# Patient Record
Sex: FEMALE | Race: WHITE | NOT HISPANIC OR LATINO | Employment: OTHER | URBAN - METROPOLITAN AREA
[De-identification: names, ages, dates, MRNs, and addresses within clinical notes are randomized per-mention and may not be internally consistent; named-entity substitution may affect disease eponyms.]

---

## 2017-02-27 ENCOUNTER — TRANSCRIBE ORDERS (OUTPATIENT)
Dept: MAMMOGRAPHY | Facility: HOSPITAL | Age: 67
End: 2017-02-27

## 2017-02-27 DIAGNOSIS — Z12.31 VISIT FOR SCREENING MAMMOGRAM: Primary | ICD-10-CM

## 2017-03-20 ENCOUNTER — HOSPITAL ENCOUNTER (OUTPATIENT)
Dept: MAMMOGRAPHY | Facility: HOSPITAL | Age: 67
Discharge: HOME | End: 2017-03-20
Attending: OBSTETRICS & GYNECOLOGY | Admitting: OBSTETRICS & GYNECOLOGY

## 2017-03-20 DIAGNOSIS — Z12.31 VISIT FOR SCREENING MAMMOGRAM: ICD-10-CM

## 2017-03-20 PROCEDURE — 77067 SCR MAMMO BI INCL CAD: CPT | Performed by: RADIOLOGY

## 2017-03-20 PROCEDURE — G0202 SCR MAMMO BI INCL CAD: HCPCS

## 2017-03-20 PROCEDURE — 77063 BREAST TOMOSYNTHESIS BI: CPT | Performed by: RADIOLOGY

## 2017-03-20 PROCEDURE — 77063 BREAST TOMOSYNTHESIS BI: CPT

## 2017-05-13 ENCOUNTER — OFFICE VISIT (OUTPATIENT)
Dept: RETAIL CLINIC | Facility: CLINIC | Age: 67
End: 2017-05-13

## 2017-05-13 VITALS — HEART RATE: 69 BPM | TEMPERATURE: 98.4 F | OXYGEN SATURATION: 97 %

## 2017-05-13 DIAGNOSIS — J06.9 ACUTE UPPER RESPIRATORY INFECTION: Primary | ICD-10-CM

## 2017-05-13 PROCEDURE — 99213 OFFICE O/P EST LOW 20 MIN: CPT | Performed by: NURSE PRACTITIONER

## 2017-05-13 RX ORDER — FLUTICASONE PROPIONATE 50 MCG
2 SPRAY, SUSPENSION (ML) NASAL DAILY
Qty: 1 EACH | Refills: 0 | Status: SHIPPED | OUTPATIENT
Start: 2017-05-13 | End: 2017-06-12

## 2017-05-13 RX ORDER — PSEUDOEPHEDRINE HYDROCHLORIDE 60 MG/1
60 TABLET, FILM COATED ORAL EVERY 6 HOURS PRN
Qty: 15 TABLET | Refills: 0 | Status: SHIPPED | OUTPATIENT
Start: 2017-05-13 | End: 2018-02-17

## 2017-07-10 ENCOUNTER — OFFICE VISIT (OUTPATIENT)
Dept: ONCOLOGY | Facility: CLINIC | Age: 67
End: 2017-07-10

## 2017-07-10 ENCOUNTER — APPOINTMENT (OUTPATIENT)
Dept: LAB | Facility: HOSPITAL | Age: 67
End: 2017-07-10

## 2017-07-10 VITALS
DIASTOLIC BLOOD PRESSURE: 66 MMHG | HEIGHT: 62 IN | BODY MASS INDEX: 33.68 KG/M2 | SYSTOLIC BLOOD PRESSURE: 122 MMHG | HEART RATE: 88 BPM | RESPIRATION RATE: 18 BRPM | TEMPERATURE: 97.4 F | WEIGHT: 183 LBS

## 2017-07-10 DIAGNOSIS — C83.31 DIFFUSE LARGE B-CELL LYMPHOMA OF LYMPH NODES OF NECK (HCC): Primary | ICD-10-CM

## 2017-07-10 LAB
ALBUMIN SERPL-MCNC: 4.1 G/DL (ref 3.2–4.8)
ALBUMIN/GLOB SERPL: 1.9 G/DL (ref 1.5–2.5)
ALP SERPL-CCNC: 91 U/L (ref 25–100)
ALT SERPL W P-5'-P-CCNC: 21 U/L (ref 7–40)
ANION GAP SERPL CALCULATED.3IONS-SCNC: 11 MMOL/L (ref 3–11)
AST SERPL-CCNC: 22 U/L (ref 0–33)
BILIRUB SERPL-MCNC: 0.4 MG/DL (ref 0.3–1.2)
BUN BLD-MCNC: 15 MG/DL (ref 9–23)
BUN/CREAT SERPL: 15 (ref 7–25)
CALCIUM SPEC-SCNC: 9.6 MG/DL (ref 8.7–10.4)
CHLORIDE SERPL-SCNC: 103 MMOL/L (ref 99–109)
CO2 SERPL-SCNC: 27 MMOL/L (ref 20–31)
CREAT BLD-MCNC: 1 MG/DL (ref 0.6–1.3)
ERYTHROCYTE [DISTWIDTH] IN BLOOD BY AUTOMATED COUNT: 13.7 % (ref 11.3–14.5)
GFR SERPL CREATININE-BSD FRML MDRD: 56 ML/MIN/1.73
GLOBULIN UR ELPH-MCNC: 2.2 GM/DL
GLUCOSE BLD-MCNC: 172 MG/DL (ref 70–100)
HCT VFR BLD AUTO: 38.6 % (ref 34.5–44)
HGB BLD-MCNC: 12.2 G/DL (ref 11.5–15.5)
LDH SERPL-CCNC: 208 U/L (ref 120–246)
LYMPHOCYTES # BLD AUTO: 2 10*3/MM3 (ref 0.6–4.8)
LYMPHOCYTES NFR BLD AUTO: 40.7 % (ref 24–44)
MCH RBC QN AUTO: 27.9 PG (ref 27–31)
MCHC RBC AUTO-ENTMCNC: 31.6 G/DL (ref 32–36)
MCV RBC AUTO: 88.1 FL (ref 80–99)
MONOCYTES # BLD AUTO: 0.2 10*3/MM3 (ref 0–1)
MONOCYTES NFR BLD AUTO: 3.3 % (ref 0–12)
NEUTROPHILS # BLD AUTO: 2.8 10*3/MM3 (ref 1.5–8.3)
NEUTROPHILS NFR BLD AUTO: 56 % (ref 41–71)
PLATELET # BLD AUTO: 242 10*3/MM3 (ref 150–450)
PMV BLD AUTO: 6.5 FL (ref 6–12)
POTASSIUM BLD-SCNC: 3.6 MMOL/L (ref 3.5–5.5)
PROT SERPL-MCNC: 6.3 G/DL (ref 5.7–8.2)
RBC # BLD AUTO: 4.39 10*6/MM3 (ref 3.89–5.14)
SODIUM BLD-SCNC: 141 MMOL/L (ref 132–146)
WBC NRBC COR # BLD: 5 10*3/MM3 (ref 3.5–10.8)

## 2017-07-10 PROCEDURE — 83615 LACTATE (LD) (LDH) ENZYME: CPT | Performed by: NURSE PRACTITIONER

## 2017-07-10 PROCEDURE — 36415 COLL VENOUS BLD VENIPUNCTURE: CPT | Performed by: NURSE PRACTITIONER

## 2017-07-10 PROCEDURE — 80053 COMPREHEN METABOLIC PANEL: CPT | Performed by: NURSE PRACTITIONER

## 2017-07-10 PROCEDURE — 99212 OFFICE O/P EST SF 10 MIN: CPT | Performed by: NURSE PRACTITIONER

## 2017-07-10 PROCEDURE — 85025 COMPLETE CBC W/AUTO DIFF WBC: CPT | Performed by: NURSE PRACTITIONER

## 2017-07-10 NOTE — PROGRESS NOTES
"      PROBLEM LIST:  1. Diffuse large B-cell lymphoma.   a) Original diagnosis 06/2008 from a large right neck mass.   b) Complete response to R-CHOP x6.   c) Radiation in 06/2009 for local recurrence in the right neck without recurrence following this.      CHIEF COMPLAINT: Follow-up about lymphoma.    Subjective     HISTORY OF PRESENT ILLNESS:   Mrs. Fitzpatrick is here for follow-up evaluation of lymphoma.  She has been well since her last visit.  She is maintaining her usual daily activities.  She denies any recurring fevers, drenching night sweats, unintentional weight loss, rashes, itching, or lymphadenopathy.    Past Medical History, Past Surgical History, Social History, Family History have been reviewed and are without significant changes except as mentioned.    Review of Systems   A comprehensive 14 point review of systems was performed and was negative except as mentioned.    Medications:  The current medication list was reviewed in the EMR    ALLERGIES:  No Known Allergies    Objective      /66  Pulse 88  Temp 97.4 °F (36.3 °C)  Resp 18  Ht 62\" (157.5 cm)  Wt 183 lb (83 kg)  LMP 03/20/2001  BMI 33.47 kg/m2         General: well appearing, in no acute distress   HEENT: sclera anicteric, oropharynx clear  Lymphatics: no cervical, supraclavicular, or axillary adenopathy  Cardiovascular: regular rate and rhythm, no murmurs  Lungs: clear to auscultation bilaterally  Abdomen: soft, nontender, nondistended.  No palpable masses or organomegaly  Extremeties: no lower extremity edema, cords or calf tenderness  Skin: no rashes, lesions, bruising, or petechiae    RECENT LABS:  Hematology WBC   Date Value Ref Range Status   05/23/2016 4.30 3.50 - 10.80 K/mcL Final     Hemoglobin   Date Value Ref Range Status   05/23/2016 12.3 11.5 - 15.5 g/dL Final     Hematocrit   Date Value Ref Range Status   05/23/2016 37.5 34.5 - 44.0 % Final     MCV   Date Value Ref Range Status   05/23/2016 87.5 80.0 - 99.0 fL Final "     RDW   Date Value Ref Range Status   05/23/2016 13.9 11.3 - 14.5 % Final     MPV   Date Value Ref Range Status   05/23/2016 6.4 fL Final     Platelets   Date Value Ref Range Status   05/23/2016 235 150 - 450 K/mcL Final     Neutrophils Absolute   Date Value Ref Range Status   05/23/2016 2.40 1.50 - 8.30 K/mcL Final     Lymphocytes Absolute   Date Value Ref Range Status   05/23/2016 1.60 0.60 - 4.80 K/mcL Final     Monocytes Absolute   Date Value Ref Range Status   05/23/2016 0.20 0.00 - 1.00 K/mcL Final     Eosinophils Absolute   Date Value Ref Range Status   06/12/2015 0.16 0.10 - 0.30 K/mcL Final     Basophils Absolute   Date Value Ref Range Status   06/12/2015 0.03 0.00 - 0.20 K/mcL Final     nRBC   Date Value Ref Range Status   06/05/2014 0.0  Final       Glucose   Date Value Ref Range Status   05/23/2016 111 (H) 70 - 100 mg/dL Final     Sodium   Date Value Ref Range Status   05/23/2016 145 132 - 146 mmol/L Final     Potassium   Date Value Ref Range Status   05/23/2016 3.9 3.5 - 5.5 mmol/L Final     CO2   Date Value Ref Range Status   05/23/2016 34 (H) 20 - 31 mmol/L Final     Chloride   Date Value Ref Range Status   05/23/2016 105 99 - 109 mmol/L Final     Anion Gap   Date Value Ref Range Status   05/23/2016 6 3 - 11 mmol/L Final     Creatinine   Date Value Ref Range Status   05/23/2016 0.9 0.6 - 1.3 mg/dL Final     BUN   Date Value Ref Range Status   05/23/2016 17 9 - 23 mg/dL Final     Calcium   Date Value Ref Range Status   05/23/2016 9.5 8.7 - 10.4 mg/dL Final     Alkaline Phosphatase   Date Value Ref Range Status   05/23/2016 88 46 - 116 Units/L Final     Total Protein   Date Value Ref Range Status   05/23/2016 6.7 5.7 - 8.2 g/dL Final     ALT (SGPT)   Date Value Ref Range Status   05/23/2016 15 7 - 40 Units/L Final     AST (SGOT)   Date Value Ref Range Status   05/23/2016 17 0 - 33 Units/L Final     Total Bilirubin   Date Value Ref Range Status   05/23/2016 0.2 (L) 0.3 - 1.2 mg/dL Final     Albumin    Date Value Ref Range Status   05/23/2016 4.2 3.2 - 4.8 g/dL Final       LDH   Date Value Ref Range Status   05/23/2016 189 120 - 246 Units/L Final          Assessment/Plan   IMPRESSION:   1. Treated diffuse large B-cell lymphoma. She continues to do well after her initial chemotherapy followed by radiation for local recurrence. She has no evidence of recurrence at this time.      PLAN:   1. We will obtain labs today including CBC, CMP, and LDH and contact her if they are adversely changed.   2. We will plan on seeing her back in 1 year for follow-up evaluation. She has been instructed to contact us in the interim if any new symptoms arise.                   JUAN Harrell  [facility] Hematology and Oncology    7/10/2017          CC:

## 2018-02-17 ENCOUNTER — OFFICE VISIT (OUTPATIENT)
Dept: RETAIL CLINIC | Facility: CLINIC | Age: 68
End: 2018-02-17

## 2018-02-17 VITALS
DIASTOLIC BLOOD PRESSURE: 76 MMHG | RESPIRATION RATE: 18 BRPM | WEIGHT: 181 LBS | BODY MASS INDEX: 30.9 KG/M2 | SYSTOLIC BLOOD PRESSURE: 100 MMHG | HEIGHT: 64 IN | HEART RATE: 103 BPM | TEMPERATURE: 98.9 F | OXYGEN SATURATION: 96 %

## 2018-02-17 DIAGNOSIS — H66.93 BILATERAL ACUTE OTITIS MEDIA: Primary | ICD-10-CM

## 2018-02-17 DIAGNOSIS — K21.9 GASTROESOPHAGEAL REFLUX DISEASE, ESOPHAGITIS PRESENCE NOT SPECIFIED: ICD-10-CM

## 2018-02-17 LAB
EXPIRATION DATE: NORMAL
FLUAV AG NPH QL: NEGATIVE
FLUBV AG NPH QL: NEGATIVE
INTERNAL CONTROL: NORMAL
Lab: NORMAL

## 2018-02-17 PROCEDURE — 99214 OFFICE O/P EST MOD 30 MIN: CPT | Performed by: NURSE PRACTITIONER

## 2018-02-17 PROCEDURE — 87804 INFLUENZA ASSAY W/OPTIC: CPT | Performed by: NURSE PRACTITIONER

## 2018-02-17 RX ORDER — RANITIDINE 150 MG/1
150 TABLET ORAL NIGHTLY
Qty: 30 TABLET | Refills: 0 | Status: SHIPPED | OUTPATIENT
Start: 2018-02-17 | End: 2018-08-20 | Stop reason: HOSPADM

## 2018-02-17 RX ORDER — AMOXICILLIN AND CLAVULANATE POTASSIUM 875; 125 MG/1; MG/1
1 TABLET, FILM COATED ORAL EVERY 12 HOURS SCHEDULED
Qty: 20 TABLET | Refills: 0 | Status: SHIPPED | OUTPATIENT
Start: 2018-02-17 | End: 2018-02-27

## 2018-02-17 NOTE — PROGRESS NOTES
Subjective   Sore Throat    Susy Fitzpatrick is a 67 y.o. female who presents with sore throat and bilateral ear pain. This patient with a history of diffuse B-cell lymphoma with bulky right neck involvement (diagnosed in June, 2008) s/p 6 cycles R-CHOP chemotherapy with complete remission. She then received XRT for local recurrence at right neck in June 2009.The patient reports intermittent swelling of right neck since completion of therapy. She also notes chronic xerostomia and occasional sore throat re: dryness.      Patient with second complaint of increased incidence of reflux. Had history of long term Omeprazole use, discontinued by PCP. She says last Endo was 2 years ago, performed at outside facility. The patient is considered a marginal historian.      Sore Throat    This is a new problem. The current episode started yesterday. The problem has been waxing and waning. The pain is worse on the right side. There has been no fever. Associated symptoms include coughing, ear pain and headaches. Pertinent negatives include no congestion, diarrhea, ear discharge, hoarse voice, neck pain, shortness of breath, swollen glands or vomiting. Trouble swallowing: mild painful swallowing. She has tried nothing for the symptoms.        History Obtained from: Patient    Past Medical History:   Diagnosis Date   • Drug therapy     20 TREATMENTS [id number]   • Lymphoma      Past Surgical History:   Procedure Laterality Date   • CHOLECYSTECTOMY       Social History     Social History   • Marital status:      Spouse name: N/A   • Number of children: N/A   • Years of education: N/A     Occupational History   • Not on file.     Social History Main Topics   • Smoking status: Never Smoker   • Smokeless tobacco: Never Used   • Alcohol use Yes      Comment: rare   • Drug use: No   • Sexual activity: Defer     Other Topics Concern   • Not on file     Social History Narrative     Family History   Problem Relation Age of Onset   •  "Ovarian cancer Sister      unknown   • Cancer Father      No Known Allergies  Current Outpatient Prescriptions   Medication Sig Dispense Refill   • amoxicillin-clavulanate (AUGMENTIN) 875-125 MG per tablet Take 1 tablet by mouth Every 12 (Twelve) Hours for 10 days. 20 tablet 0   • raNITIdine (ZANTAC) 150 MG tablet Take 1 tablet by mouth Every Night. 30 tablet 0     No current facility-administered medications for this visit.         The following portions of the patient's history were reviewed and updated as appropriate: allergies, current medications, past family history, past medical history, past social history and past surgical history.    Review of Systems   Constitutional: Positive for chills. Negative for fatigue, fever and unexpected weight change.   HENT: Positive for ear pain, rhinorrhea and sore throat. Negative for congestion, ear discharge, hoarse voice, sinus pain and voice change. Trouble swallowing: mild painful swallowing.    Eyes: Negative.    Respiratory: Positive for cough. Negative for chest tightness, shortness of breath and wheezing.    Cardiovascular: Negative for chest pain and palpitations.   Gastrointestinal: Negative for diarrhea, nausea and vomiting.   Musculoskeletal: Negative for neck pain and neck stiffness.   Skin: Negative for rash and wound.   Neurological: Positive for headaches. Negative for dizziness and light-headedness.   Hematological: Negative for adenopathy.   Psychiatric/Behavioral: Negative for confusion.       Objective     VITAL SIGNS:   Vitals:    02/17/18 1652   BP: 100/76   Pulse: 103   Resp: 18   Temp: 98.9 °F (37.2 °C)   SpO2: 96%   Weight: 82.1 kg (181 lb)   Height: 162.6 cm (64\")   Body mass index is 31.07 kg/(m^2).    Physical Exam   Constitutional: She is cooperative.  Non-toxic appearance. No distress.   HENT:   Head: Normocephalic and atraumatic.   Right Ear: External ear and ear canal normal. Tympanic membrane is erythematous and bulging. Tympanic membrane " is not perforated.   Left Ear: External ear and ear canal normal. Tympanic membrane is erythematous and bulging. Tympanic membrane is not perforated.   Nose: Mucosal edema present.   Mouth/Throat: Uvula is midline and mucous membranes are normal. Uvula swelling (mild) present. Posterior oropharyngeal erythema (mild) present. Tonsils are 0 on the right. No tonsillar exudate.   No tonsillar mass palpated   Neck: Phonation normal. Neck supple. No JVD present. No tracheal tenderness and no spinous process tenderness present. No rigidity. No tracheal deviation, no edema and normal range of motion present.   Cardiovascular: Regular rhythm.  Tachycardia present.    No murmur heard.  Pulmonary/Chest: No accessory muscle usage. No tachypnea. No respiratory distress. She has no decreased breath sounds. She has no wheezes. She has no rhonchi. She has no rales.   Musculoskeletal: She exhibits no edema or deformity.   Lymphadenopathy:     She has no cervical adenopathy.     She has no axillary adenopathy.        Right: No supraclavicular adenopathy present.        Left: No supraclavicular adenopathy present.   Neurological: She is alert. Gait normal.   Skin: Skin is warm and dry. No rash noted. No cyanosis. No pallor.   Psychiatric: Her behavior is normal. She is attentive.       LABS:   Results for orders placed or performed in visit on 02/17/18   POCT Influenza A/B   Result Value Ref Range    Rapid Influenza A Ag Negative     Rapid Influenza B Ag Negative     Internal Control Passed Passed    Lot Number [id number]     Expiration Date 11/8/20        CLINICAL QUALITY MEASURES:  Tobacco Screening & Intervention Screened & identified as tobacco non-user. Never smoker   WEIGHT SCREENING/BMI  Not eligible, overweight & managed by other physician     Assessment/Plan     Susy was seen today for sore throat.    Diagnoses and all orders for this visit:    Bilateral acute otitis media  -     POCT Influenza A/B    Gastroesophageal reflux  disease, esophagitis presence not specified    Other orders  -     amoxicillin-clavulanate (AUGMENTIN) 875-125 MG per tablet; Take 1 tablet by mouth Every 12 (Twelve) Hours for 10 days.  -     raNITIdine (ZANTAC) 150 MG tablet; Take 1 tablet by mouth Every Night.      PLAN:  Patient should follow up with primary care provider for re-eval of reflux symptoms. See PCP or oncologist if sore throat/ear symptoms fail to improve, worsen or for the development of new symptoms that need attention.    The patient voiced understanding and agreement to the patient treatment plan and instructions     JUAN Cordon

## 2018-02-17 NOTE — PATIENT INSTRUCTIONS

## 2018-05-29 ENCOUNTER — HOSPITAL ENCOUNTER (OUTPATIENT)
Dept: GENERAL RADIOLOGY | Facility: HOSPITAL | Age: 68
Discharge: HOME | End: 2018-05-29
Attending: FAMILY MEDICINE | Admitting: FAMILY MEDICINE

## 2018-05-29 ENCOUNTER — TRANSCRIBE ORDERS (OUTPATIENT)
Dept: ADMINISTRATIVE | Facility: HOSPITAL | Age: 68
End: 2018-05-29

## 2018-05-29 DIAGNOSIS — M25.551 RIGHT HIP PAIN: Primary | ICD-10-CM

## 2018-05-29 PROCEDURE — 73502 X-RAY EXAM HIP UNI 2-3 VIEWS: CPT

## 2018-08-10 ENCOUNTER — TRANSCRIBE ORDERS (OUTPATIENT)
Dept: MAMMOGRAPHY | Facility: HOSPITAL | Age: 68
End: 2018-08-10

## 2018-08-10 DIAGNOSIS — Z12.31 VISIT FOR SCREENING MAMMOGRAM: Primary | ICD-10-CM

## 2018-08-13 ENCOUNTER — TELEPHONE (OUTPATIENT)
Dept: OTHER | Facility: HOSPITAL | Age: 68
End: 2018-08-13

## 2018-08-13 NOTE — TELEPHONE ENCOUNTER
Patient would like mammogram to be cancelled at this time due to conflict of schedule with 's med/onc appointment on same day/time. States she will call mammography scheduling when they return from their trip to reschedule. Notified mammography scheduling per patient request to cancel current mammogram on 08/20/2018.

## 2018-08-17 ENCOUNTER — HOSPITAL ENCOUNTER (OUTPATIENT)
Dept: MAMMOGRAPHY | Facility: HOSPITAL | Age: 68
Discharge: HOME | End: 2018-08-17
Attending: OBSTETRICS & GYNECOLOGY | Admitting: OBSTETRICS & GYNECOLOGY

## 2018-08-17 ENCOUNTER — APPOINTMENT (OUTPATIENT)
Dept: MAMMOGRAPHY | Facility: HOSPITAL | Age: 68
End: 2018-08-17
Attending: OBSTETRICS & GYNECOLOGY

## 2018-08-17 DIAGNOSIS — Z12.31 VISIT FOR SCREENING MAMMOGRAM: ICD-10-CM

## 2018-08-17 PROCEDURE — 77063 BREAST TOMOSYNTHESIS BI: CPT | Performed by: RADIOLOGY

## 2018-08-17 PROCEDURE — 77067 SCR MAMMO BI INCL CAD: CPT | Performed by: RADIOLOGY

## 2018-08-17 PROCEDURE — 77063 BREAST TOMOSYNTHESIS BI: CPT

## 2018-08-17 PROCEDURE — 77067 SCR MAMMO BI INCL CAD: CPT

## 2018-08-20 ENCOUNTER — APPOINTMENT (OUTPATIENT)
Dept: MAMMOGRAPHY | Facility: HOSPITAL | Age: 68
End: 2018-08-20
Attending: OBSTETRICS & GYNECOLOGY

## 2018-08-20 ENCOUNTER — OFFICE VISIT (OUTPATIENT)
Dept: ONCOLOGY | Facility: CLINIC | Age: 68
End: 2018-08-20

## 2018-08-20 VITALS
WEIGHT: 189 LBS | TEMPERATURE: 98.1 F | HEART RATE: 63 BPM | RESPIRATION RATE: 17 BRPM | DIASTOLIC BLOOD PRESSURE: 76 MMHG | SYSTOLIC BLOOD PRESSURE: 149 MMHG | BODY MASS INDEX: 32.27 KG/M2 | HEIGHT: 64 IN

## 2018-08-20 DIAGNOSIS — C83.31 DIFFUSE LARGE B-CELL LYMPHOMA OF LYMPH NODES OF NECK (HCC): ICD-10-CM

## 2018-08-20 PROCEDURE — 99213 OFFICE O/P EST LOW 20 MIN: CPT | Performed by: INTERNAL MEDICINE

## 2018-08-20 RX ORDER — MELATONIN
1000 DAILY
COMMUNITY
End: 2019-01-29

## 2018-08-20 NOTE — PROGRESS NOTES
"      PROBLEM LIST:  1. Diffuse large B-cell lymphoma   a) Original diagnosis 06/2008 from a large right neck mass.   b) Complete response to R-CHOP x6.   c) Radiation in 06/2009 for local recurrence in the right neck without further recurrence.          HISTORY OF PRESENT ILLNESS:   Chief complaint: Here about lymphoma follow-up  Ms. Fitzpatrick hasn't noted any palpable nodes, fevers, weight loss, or other symptoms of recurrent lymphoma.  In having some pain in her right hip diagnosed as arthritis.  It's aching and moderate in severity and is been gradually increasing for a long time.    Social history: Her  is just been diagnosed with lung cancer.  She retired in March    Past Medical History, Past Surgical History, Social History, Family History have been reviewed and are without significant changes except as mentioned.    Review of Systems   A comprehensive 14 point review of systems was performed and was negative except as mentioned.    Medications:  The current medication list was reviewed in the EMR    ALLERGIES:  Allergies not on file           /76   Pulse 63   Temp 98.1 °F (36.7 °C) (Temporal Artery )   Resp 17   Ht 162.6 cm (64\")   Wt 85.7 kg (189 lb)   LMP 03/20/2001   BMI 32.44 kg/m²      Performance Status: ECOG 0    General: well appearing, in no acute distress  HEENT: sclera anicteric, oropharynx clear  Lymphatics: no cervical, supraclavicular, or axillary adenopathy  Cardiovascular: regular rate and rhythm, no murmurs  Lungs: clear to auscultation bilaterally  Abdomen: soft, nontender, nondistended.  No palpable organomegaly  Extremeties: no lower extremity edema  Skin: no rashes, lesions, bruising, or petechiae    RECENT LABS:   WBC   Date Value Ref Range Status   07/10/2017 5.00 3.50 - 10.80 10*3/mm3 Final     Hemoglobin   Date Value Ref Range Status   07/10/2017 12.2 11.5 - 15.5 g/dL Final     Hematocrit   Date Value Ref Range Status   07/10/2017 38.6 34.5 - 44.0 % Final     MCV "   Date Value Ref Range Status   07/10/2017 88.1 80.0 - 99.0 fL Final     RDW   Date Value Ref Range Status   07/10/2017 13.7 11.3 - 14.5 % Final     MPV   Date Value Ref Range Status   07/10/2017 6.5 6.0 - 12.0 fL Final     Platelets   Date Value Ref Range Status   07/10/2017 242 150 - 450 10*3/mm3 Final     Neutrophils, Absolute   Date Value Ref Range Status   07/10/2017 2.80 1.50 - 8.30 10*3/mm3 Final     Lymphocytes, Absolute   Date Value Ref Range Status   07/10/2017 2.00 0.60 - 4.80 10*3/mm3 Final     Monocytes, Absolute   Date Value Ref Range Status   07/10/2017 0.20 0.00 - 1.00 10*3/mm3 Final     Eosinophils Absolute   Date Value Ref Range Status   06/12/2015 0.16 0.10 - 0.30 K/mcL Final     Basophils Absolute   Date Value Ref Range Status   06/12/2015 0.03 0.00 - 0.20 K/mcL Final     nRBC   Date Value Ref Range Status   06/05/2014 0.0  Final       Glucose   Date Value Ref Range Status   07/10/2017 172 (H) 70 - 100 mg/dL Final     Sodium   Date Value Ref Range Status   07/10/2017 141 132 - 146 mmol/L Final     Potassium   Date Value Ref Range Status   07/10/2017 3.6 3.5 - 5.5 mmol/L Final     CO2   Date Value Ref Range Status   07/10/2017 27.0 20.0 - 31.0 mmol/L Final     Chloride   Date Value Ref Range Status   07/10/2017 103 99 - 109 mmol/L Final     Anion Gap   Date Value Ref Range Status   07/10/2017 11.0 3.0 - 11.0 mmol/L Final     Creatinine   Date Value Ref Range Status   07/10/2017 1.00 0.60 - 1.30 mg/dL Final     BUN   Date Value Ref Range Status   07/10/2017 15 9 - 23 mg/dL Final     BUN/Creatinine Ratio   Date Value Ref Range Status   07/10/2017 15.0 7.0 - 25.0 Final     Calcium   Date Value Ref Range Status   07/10/2017 9.6 8.7 - 10.4 mg/dL Final     eGFR Non  Amer   Date Value Ref Range Status   07/10/2017 56 (L) >60 mL/min/1.73 Final     Alkaline Phosphatase   Date Value Ref Range Status   07/10/2017 91 25 - 100 U/L Final     Total Protein   Date Value Ref Range Status   07/10/2017 6.3  5.7 - 8.2 g/dL Final     ALT (SGPT)   Date Value Ref Range Status   07/10/2017 21 7 - 40 U/L Final     AST (SGOT)   Date Value Ref Range Status   07/10/2017 22 0 - 33 U/L Final     Total Bilirubin   Date Value Ref Range Status   07/10/2017 0.4 0.3 - 1.2 mg/dL Final     Albumin   Date Value Ref Range Status   07/10/2017 4.10 3.20 - 4.80 g/dL Final     Globulin   Date Value Ref Range Status   07/10/2017 2.2 gm/dL Final       LDH   Date Value Ref Range Status   07/10/2017 208 120 - 246 U/L Final       No results found for: MSPIKE, KAPPALAMB, IGLFLC, FREEKAPPAL              Impression: 1.  Diffuse large B-cell lymphoma without evidence of recurrence.  She's now 10 years her initial diagnosis.  She did have a relapse in the right neck with involved field radiation and no evidence of recurrence after that.    Plan: 1.  I've asked her to return here in one year.  I reminded her to keep her regular follow-up in her regular health maintenance in the interim.                       Isidoro Moncada MD  [facility] Hematology and Oncology    08/20/18           CC:

## 2018-11-07 ENCOUNTER — TRANSCRIBE ORDERS (OUTPATIENT)
Dept: ADMINISTRATIVE | Facility: HOSPITAL | Age: 68
End: 2018-11-07

## 2018-11-07 ENCOUNTER — HOSPITAL ENCOUNTER (OUTPATIENT)
Dept: GENERAL RADIOLOGY | Facility: HOSPITAL | Age: 68
Discharge: HOME | End: 2018-11-07
Attending: FAMILY MEDICINE | Admitting: FAMILY MEDICINE

## 2018-11-07 DIAGNOSIS — M79.604 RIGHT LEG PAIN: Primary | ICD-10-CM

## 2018-11-07 DIAGNOSIS — M79.604 RIGHT LEG PAIN: ICD-10-CM

## 2018-11-07 PROCEDURE — 72100 X-RAY EXAM L-S SPINE 2/3 VWS: CPT

## 2018-11-07 PROCEDURE — 73552 X-RAY EXAM OF FEMUR 2/>: CPT

## 2019-01-29 ENCOUNTER — HOSPITAL ENCOUNTER (OUTPATIENT)
Dept: GENERAL RADIOLOGY | Facility: HOSPITAL | Age: 69
Discharge: HOME | End: 2019-01-29
Admitting: ORTHOPAEDIC SURGERY

## 2019-01-29 ENCOUNTER — APPOINTMENT (OUTPATIENT)
Dept: PREADMISSION TESTING | Facility: HOSPITAL | Age: 69
End: 2019-01-29

## 2019-01-29 VITALS — WEIGHT: 183.86 LBS | HEIGHT: 62 IN | BODY MASS INDEX: 33.84 KG/M2

## 2019-01-29 LAB
ABO GROUP BLD: NORMAL
ALBUMIN SERPL-MCNC: 4.45 G/DL (ref 3.2–4.8)
ALBUMIN/GLOB SERPL: 2.5 G/DL (ref 1.5–2.5)
ALP SERPL-CCNC: 104 U/L (ref 25–100)
ALT SERPL W P-5'-P-CCNC: 21 U/L (ref 7–40)
ANION GAP SERPL CALCULATED.3IONS-SCNC: 6 MMOL/L (ref 3–11)
AST SERPL-CCNC: 19 U/L (ref 0–33)
BACTERIA UR QL AUTO: NORMAL /HPF
BASOPHILS # BLD AUTO: 0.03 10*3/MM3 (ref 0–0.2)
BASOPHILS NFR BLD AUTO: 0.6 % (ref 0–1)
BILIRUB SERPL-MCNC: 0.3 MG/DL (ref 0.3–1.2)
BILIRUB UR QL STRIP: NEGATIVE
BLD GP AB SCN SERPL QL: NEGATIVE
BUN BLD-MCNC: 17 MG/DL (ref 9–23)
BUN/CREAT SERPL: 20.5 (ref 7–25)
CALCIUM SPEC-SCNC: 9.3 MG/DL (ref 8.7–10.4)
CHLORIDE SERPL-SCNC: 104 MMOL/L (ref 99–109)
CLARITY UR: CLEAR
CO2 SERPL-SCNC: 31 MMOL/L (ref 20–31)
COLOR UR: YELLOW
CREAT BLD-MCNC: 0.83 MG/DL (ref 0.6–1.3)
CRP SERPL-MCNC: 0.18 MG/DL (ref 0–1)
DEPRECATED RDW RBC AUTO: 42.8 FL (ref 37–54)
EOSINOPHIL # BLD AUTO: 0.14 10*3/MM3 (ref 0–0.3)
EOSINOPHIL NFR BLD AUTO: 2.6 % (ref 0–3)
ERYTHROCYTE [DISTWIDTH] IN BLOOD BY AUTOMATED COUNT: 13.4 % (ref 11.3–14.5)
ERYTHROCYTE [SEDIMENTATION RATE] IN BLOOD: 10 MM/HR (ref 0–30)
GFR SERPL CREATININE-BSD FRML MDRD: 69 ML/MIN/1.73
GLOBULIN UR ELPH-MCNC: 1.8 GM/DL
GLUCOSE BLD-MCNC: 91 MG/DL (ref 70–100)
GLUCOSE UR STRIP-MCNC: NEGATIVE MG/DL
HBA1C MFR BLD: 5.5 % (ref 4.8–5.6)
HCT VFR BLD AUTO: 38.5 % (ref 34.5–44)
HGB BLD-MCNC: 12.7 G/DL (ref 11.5–15.5)
HGB UR QL STRIP.AUTO: NEGATIVE
HYALINE CASTS UR QL AUTO: NORMAL /LPF
IMM GRANULOCYTES # BLD AUTO: 0 10*3/MM3 (ref 0–0.03)
IMM GRANULOCYTES NFR BLD AUTO: 0 % (ref 0–0.6)
KETONES UR QL STRIP: NEGATIVE
LEUKOCYTE ESTERASE UR QL STRIP.AUTO: NEGATIVE
LYMPHOCYTES # BLD AUTO: 2.12 10*3/MM3 (ref 0.6–4.8)
LYMPHOCYTES NFR BLD AUTO: 39.8 % (ref 24–44)
MCH RBC QN AUTO: 28.8 PG (ref 27–31)
MCHC RBC AUTO-ENTMCNC: 33 G/DL (ref 32–36)
MCV RBC AUTO: 87.3 FL (ref 80–99)
MONOCYTES # BLD AUTO: 0.4 10*3/MM3 (ref 0–1)
MONOCYTES NFR BLD AUTO: 7.5 % (ref 0–12)
NEUTROPHILS # BLD AUTO: 2.64 10*3/MM3 (ref 1.5–8.3)
NEUTROPHILS NFR BLD AUTO: 49.5 % (ref 41–71)
NITRITE UR QL STRIP: NEGATIVE
PH UR STRIP.AUTO: 5.5 [PH] (ref 5–8)
PLATELET # BLD AUTO: 206 10*3/MM3 (ref 150–450)
PMV BLD AUTO: 9.2 FL (ref 6–12)
POTASSIUM BLD-SCNC: 3.7 MMOL/L (ref 3.5–5.5)
PROT SERPL-MCNC: 6.2 G/DL (ref 5.7–8.2)
PROT UR QL STRIP: NEGATIVE
RBC # BLD AUTO: 4.41 10*6/MM3 (ref 3.89–5.14)
RBC # UR: NORMAL /HPF
REF LAB TEST METHOD: NORMAL
RH BLD: POSITIVE
SODIUM BLD-SCNC: 141 MMOL/L (ref 132–146)
SP GR UR STRIP: 1.02 (ref 1–1.03)
SQUAMOUS #/AREA URNS HPF: NORMAL /HPF
UROBILINOGEN UR QL STRIP: NORMAL
WBC NRBC COR # BLD: 5.33 10*3/MM3 (ref 3.5–10.8)
WBC UR QL AUTO: NORMAL /HPF

## 2019-01-29 PROCEDURE — 80053 COMPREHEN METABOLIC PANEL: CPT | Performed by: ORTHOPAEDIC SURGERY

## 2019-01-29 PROCEDURE — 86140 C-REACTIVE PROTEIN: CPT | Performed by: ORTHOPAEDIC SURGERY

## 2019-01-29 PROCEDURE — 36415 COLL VENOUS BLD VENIPUNCTURE: CPT

## 2019-01-29 PROCEDURE — 93010 ELECTROCARDIOGRAM REPORT: CPT | Performed by: INTERNAL MEDICINE

## 2019-01-29 PROCEDURE — 71046 X-RAY EXAM CHEST 2 VIEWS: CPT

## 2019-01-29 PROCEDURE — 86901 BLOOD TYPING SEROLOGIC RH(D): CPT | Performed by: ORTHOPAEDIC SURGERY

## 2019-01-29 PROCEDURE — 83036 HEMOGLOBIN GLYCOSYLATED A1C: CPT | Performed by: ORTHOPAEDIC SURGERY

## 2019-01-29 PROCEDURE — 86900 BLOOD TYPING SEROLOGIC ABO: CPT | Performed by: ORTHOPAEDIC SURGERY

## 2019-01-29 PROCEDURE — 81001 URINALYSIS AUTO W/SCOPE: CPT | Performed by: ORTHOPAEDIC SURGERY

## 2019-01-29 PROCEDURE — 86850 RBC ANTIBODY SCREEN: CPT | Performed by: ORTHOPAEDIC SURGERY

## 2019-01-29 PROCEDURE — 85025 COMPLETE CBC W/AUTO DIFF WBC: CPT | Performed by: ORTHOPAEDIC SURGERY

## 2019-01-29 PROCEDURE — 93005 ELECTROCARDIOGRAM TRACING: CPT

## 2019-01-29 RX ORDER — IBUPROFEN 200 MG
200 TABLET ORAL EVERY 6 HOURS PRN
Status: ON HOLD | COMMUNITY
End: 2019-02-14 | Stop reason: SDUPTHER

## 2019-01-29 ASSESSMENT — HOOS JR
HOOS JR SCORE: 16
HOOS JR SCORE: 39.902

## 2019-02-08 ENCOUNTER — ANESTHESIA EVENT (OUTPATIENT)
Dept: PERIOP | Facility: HOSPITAL | Age: 69
End: 2019-02-08

## 2019-02-11 ENCOUNTER — HOSPITAL ENCOUNTER (INPATIENT)
Facility: HOSPITAL | Age: 69
LOS: 3 days | Discharge: HOME HEALTH | End: 2019-02-14
Attending: ORTHOPAEDIC SURGERY | Admitting: ORTHOPAEDIC SURGERY

## 2019-02-11 ENCOUNTER — APPOINTMENT (OUTPATIENT)
Dept: GENERAL RADIOLOGY | Facility: HOSPITAL | Age: 69
End: 2019-02-11

## 2019-02-11 ENCOUNTER — ANESTHESIA (OUTPATIENT)
Dept: PERIOP | Facility: HOSPITAL | Age: 69
End: 2019-02-11

## 2019-02-11 DIAGNOSIS — Z78.9 IMPAIRED MOBILITY AND ADLS: ICD-10-CM

## 2019-02-11 DIAGNOSIS — Z74.09 IMPAIRED MOBILITY AND ADLS: ICD-10-CM

## 2019-02-11 DIAGNOSIS — Z96.641 STATUS POST TOTAL REPLACEMENT OF RIGHT HIP: ICD-10-CM

## 2019-02-11 DIAGNOSIS — Z74.09 IMPAIRED FUNCTIONAL MOBILITY, BALANCE, GAIT, AND ENDURANCE: Primary | ICD-10-CM

## 2019-02-11 PROBLEM — K21.9 GERD (GASTROESOPHAGEAL REFLUX DISEASE): Status: ACTIVE | Noted: 2019-02-11

## 2019-02-11 PROBLEM — M16.11 ARTHRITIS OF RIGHT HIP: Status: ACTIVE | Noted: 2019-02-11

## 2019-02-11 LAB
ABO GROUP BLD: NORMAL
BLD GP AB SCN SERPL QL: NEGATIVE
RH BLD: POSITIVE
T&S EXPIRATION DATE: NORMAL

## 2019-02-11 PROCEDURE — 0SR90JA REPLACEMENT OF RIGHT HIP JOINT WITH SYNTHETIC SUBSTITUTE, UNCEMENTED, OPEN APPROACH: ICD-10-PCS | Performed by: ORTHOPAEDIC SURGERY

## 2019-02-11 PROCEDURE — 25010000002 ROPIVACAINE PER 1 MG: Performed by: ORTHOPAEDIC SURGERY

## 2019-02-11 PROCEDURE — 86850 RBC ANTIBODY SCREEN: CPT | Performed by: ORTHOPAEDIC SURGERY

## 2019-02-11 PROCEDURE — 86901 BLOOD TYPING SEROLOGIC RH(D): CPT | Performed by: ORTHOPAEDIC SURGERY

## 2019-02-11 PROCEDURE — 25010000002 PHENYLEPHRINE PER 1 ML: Performed by: NURSE ANESTHETIST, CERTIFIED REGISTERED

## 2019-02-11 PROCEDURE — C1713 ANCHOR/SCREW BN/BN,TIS/BN: HCPCS | Performed by: ORTHOPAEDIC SURGERY

## 2019-02-11 PROCEDURE — C1755 CATHETER, INTRASPINAL: HCPCS | Performed by: ORTHOPAEDIC SURGERY

## 2019-02-11 PROCEDURE — 25010000003 CEFAZOLIN IN DEXTROSE 2-4 GM/100ML-% SOLUTION: Performed by: ORTHOPAEDIC SURGERY

## 2019-02-11 PROCEDURE — 97161 PT EVAL LOW COMPLEX 20 MIN: CPT

## 2019-02-11 PROCEDURE — 86923 COMPATIBILITY TEST ELECTRIC: CPT

## 2019-02-11 PROCEDURE — 86900 BLOOD TYPING SEROLOGIC ABO: CPT | Performed by: ORTHOPAEDIC SURGERY

## 2019-02-11 PROCEDURE — 76000 FLUOROSCOPY <1 HR PHYS/QHP: CPT

## 2019-02-11 PROCEDURE — 25010000002 PROPOFOL 1000 MG/ML EMULSION: Performed by: NURSE ANESTHETIST, CERTIFIED REGISTERED

## 2019-02-11 PROCEDURE — 73502 X-RAY EXAM HIP UNI 2-3 VIEWS: CPT

## 2019-02-11 PROCEDURE — C1776 JOINT DEVICE (IMPLANTABLE): HCPCS | Performed by: ORTHOPAEDIC SURGERY

## 2019-02-11 PROCEDURE — 97116 GAIT TRAINING THERAPY: CPT

## 2019-02-11 PROCEDURE — 94799 UNLISTED PULMONARY SVC/PX: CPT

## 2019-02-11 PROCEDURE — 25010000002 DEXAMETHASONE PER 1 MG: Performed by: NURSE ANESTHETIST, CERTIFIED REGISTERED

## 2019-02-11 DEVICE — R3 3 HOLE ACETABULAR SHELL 56MM
Type: IMPLANTABLE DEVICE | Site: ACETABULUM | Status: FUNCTIONAL
Brand: R3 ACETABULAR

## 2019-02-11 DEVICE — POLARSTEM COLLAR STANDARD                                    NON-CEMENTED WITH TI/HA 4
Type: IMPLANTABLE DEVICE | Site: HIP | Status: FUNCTIONAL
Brand: POLARSTEM

## 2019-02-11 DEVICE — REFLECTION SPHERICAL HEAD SCREW 25MM
Type: IMPLANTABLE DEVICE | Site: ACETABULUM | Status: FUNCTIONAL
Brand: REFLECTION

## 2019-02-11 DEVICE — R3 0 DEGREE XLPE ACETABULAR LINER                                    36MM INNER DIAMETER X OUTER DIAMETER 56MM
Type: IMPLANTABLE DEVICE | Site: ACETABULUM | Status: FUNCTIONAL
Brand: R3

## 2019-02-11 DEVICE — COBALT CHROME 12/14 TAPER FEMORAL                                    HEAD 36MM +4: Type: IMPLANTABLE DEVICE | Site: HIP | Status: FUNCTIONAL

## 2019-02-11 DEVICE — TOTL HIP STD SMITH NEPHEW: Type: IMPLANTABLE DEVICE | Site: ACETABULUM | Status: FUNCTIONAL

## 2019-02-11 RX ORDER — PANTOPRAZOLE SODIUM 40 MG/1
40 TABLET, DELAYED RELEASE ORAL EVERY MORNING
Status: DISCONTINUED | OUTPATIENT
Start: 2019-02-12 | End: 2019-02-14 | Stop reason: HOSPADM

## 2019-02-11 RX ORDER — ROPIVACAINE HYDROCHLORIDE 5 MG/ML
INJECTION, SOLUTION EPIDURAL; INFILTRATION; PERINEURAL AS NEEDED
Status: DISCONTINUED | OUTPATIENT
Start: 2019-02-11 | End: 2019-02-11 | Stop reason: HOSPADM

## 2019-02-11 RX ORDER — NALOXONE HCL 0.4 MG/ML
0.4 VIAL (ML) INJECTION AS NEEDED
Status: DISCONTINUED | OUTPATIENT
Start: 2019-02-11 | End: 2019-02-11 | Stop reason: HOSPADM

## 2019-02-11 RX ORDER — HYDROMORPHONE HYDROCHLORIDE 1 MG/ML
0.5 INJECTION, SOLUTION INTRAMUSCULAR; INTRAVENOUS; SUBCUTANEOUS
Status: DISCONTINUED | OUTPATIENT
Start: 2019-02-11 | End: 2019-02-11 | Stop reason: HOSPADM

## 2019-02-11 RX ORDER — LABETALOL HYDROCHLORIDE 5 MG/ML
5 INJECTION, SOLUTION INTRAVENOUS
Status: DISCONTINUED | OUTPATIENT
Start: 2019-02-11 | End: 2019-02-11 | Stop reason: HOSPADM

## 2019-02-11 RX ORDER — LIDOCAINE HYDROCHLORIDE 10 MG/ML
INJECTION, SOLUTION EPIDURAL; INFILTRATION; INTRACAUDAL; PERINEURAL AS NEEDED
Status: DISCONTINUED | OUTPATIENT
Start: 2019-02-11 | End: 2019-02-11 | Stop reason: SURG

## 2019-02-11 RX ORDER — SODIUM CHLORIDE 0.9 % (FLUSH) 0.9 %
3-10 SYRINGE (ML) INJECTION AS NEEDED
Status: DISCONTINUED | OUTPATIENT
Start: 2019-02-11 | End: 2019-02-11 | Stop reason: HOSPADM

## 2019-02-11 RX ORDER — ONDANSETRON 2 MG/ML
4 INJECTION INTRAMUSCULAR; INTRAVENOUS EVERY 6 HOURS PRN
Status: DISCONTINUED | OUTPATIENT
Start: 2019-02-11 | End: 2019-02-14 | Stop reason: HOSPADM

## 2019-02-11 RX ORDER — BISACODYL 10 MG
10 SUPPOSITORY, RECTAL RECTAL DAILY PRN
Status: DISCONTINUED | OUTPATIENT
Start: 2019-02-11 | End: 2019-02-14 | Stop reason: HOSPADM

## 2019-02-11 RX ORDER — SODIUM CHLORIDE, SODIUM LACTATE, POTASSIUM CHLORIDE, CALCIUM CHLORIDE 600; 310; 30; 20 MG/100ML; MG/100ML; MG/100ML; MG/100ML
100 INJECTION, SOLUTION INTRAVENOUS CONTINUOUS
Status: DISCONTINUED | OUTPATIENT
Start: 2019-02-11 | End: 2019-02-14 | Stop reason: HOSPADM

## 2019-02-11 RX ORDER — HYDROCODONE BITARTRATE AND ACETAMINOPHEN 5; 325 MG/1; MG/1
1 TABLET ORAL EVERY 4 HOURS PRN
Status: DISCONTINUED | OUTPATIENT
Start: 2019-02-11 | End: 2019-02-14 | Stop reason: HOSPADM

## 2019-02-11 RX ORDER — EPHEDRINE SULFATE 50 MG/ML
5 INJECTION, SOLUTION INTRAVENOUS ONCE AS NEEDED
Status: DISCONTINUED | OUTPATIENT
Start: 2019-02-11 | End: 2019-02-11 | Stop reason: HOSPADM

## 2019-02-11 RX ORDER — ASPIRIN 325 MG
325 TABLET, DELAYED RELEASE (ENTERIC COATED) ORAL DAILY
Status: DISCONTINUED | OUTPATIENT
Start: 2019-02-12 | End: 2019-02-14 | Stop reason: HOSPADM

## 2019-02-11 RX ORDER — CEFAZOLIN SODIUM 2 G/100ML
2 INJECTION, SOLUTION INTRAVENOUS ONCE
Status: COMPLETED | OUTPATIENT
Start: 2019-02-11 | End: 2019-02-11

## 2019-02-11 RX ORDER — SODIUM CHLORIDE, SODIUM LACTATE, POTASSIUM CHLORIDE, CALCIUM CHLORIDE 600; 310; 30; 20 MG/100ML; MG/100ML; MG/100ML; MG/100ML
9 INJECTION, SOLUTION INTRAVENOUS CONTINUOUS PRN
Status: DISCONTINUED | OUTPATIENT
Start: 2019-02-11 | End: 2019-02-11 | Stop reason: HOSPADM

## 2019-02-11 RX ORDER — ACETAMINOPHEN 325 MG/1
650 TABLET ORAL EVERY 6 HOURS SCHEDULED
Status: DISCONTINUED | OUTPATIENT
Start: 2019-02-11 | End: 2019-02-14 | Stop reason: HOSPADM

## 2019-02-11 RX ORDER — TRANEXAMIC ACID 100 MG/ML
INJECTION, SOLUTION INTRAVENOUS AS NEEDED
Status: DISCONTINUED | OUTPATIENT
Start: 2019-02-11 | End: 2019-02-11 | Stop reason: SURG

## 2019-02-11 RX ORDER — HYDROCODONE BITARTRATE AND ACETAMINOPHEN 5; 325 MG/1; MG/1
2 TABLET ORAL EVERY 4 HOURS PRN
Status: DISCONTINUED | OUTPATIENT
Start: 2019-02-11 | End: 2019-02-14 | Stop reason: HOSPADM

## 2019-02-11 RX ORDER — SODIUM CHLORIDE 0.9 % (FLUSH) 0.9 %
3 SYRINGE (ML) INJECTION EVERY 12 HOURS SCHEDULED
Status: DISCONTINUED | OUTPATIENT
Start: 2019-02-11 | End: 2019-02-11 | Stop reason: HOSPADM

## 2019-02-11 RX ORDER — FENTANYL CITRATE 50 UG/ML
50 INJECTION, SOLUTION INTRAMUSCULAR; INTRAVENOUS
Status: DISCONTINUED | OUTPATIENT
Start: 2019-02-11 | End: 2019-02-11 | Stop reason: HOSPADM

## 2019-02-11 RX ORDER — SODIUM CHLORIDE 0.9 % (FLUSH) 0.9 %
3 SYRINGE (ML) INJECTION EVERY 12 HOURS SCHEDULED
Status: DISCONTINUED | OUTPATIENT
Start: 2019-02-11 | End: 2019-02-14 | Stop reason: HOSPADM

## 2019-02-11 RX ORDER — MEPERIDINE HYDROCHLORIDE 25 MG/ML
12.5 INJECTION INTRAMUSCULAR; INTRAVENOUS; SUBCUTANEOUS
Status: DISCONTINUED | OUTPATIENT
Start: 2019-02-11 | End: 2019-02-11 | Stop reason: HOSPADM

## 2019-02-11 RX ORDER — MAGNESIUM HYDROXIDE 1200 MG/15ML
LIQUID ORAL AS NEEDED
Status: DISCONTINUED | OUTPATIENT
Start: 2019-02-11 | End: 2019-02-11 | Stop reason: HOSPADM

## 2019-02-11 RX ORDER — FAMOTIDINE 20 MG/1
20 TABLET, FILM COATED ORAL
Status: DISCONTINUED | OUTPATIENT
Start: 2019-02-11 | End: 2019-02-11 | Stop reason: HOSPADM

## 2019-02-11 RX ORDER — CEFAZOLIN SODIUM 2 G/100ML
2 INJECTION, SOLUTION INTRAVENOUS EVERY 8 HOURS
Status: COMPLETED | OUTPATIENT
Start: 2019-02-11 | End: 2019-02-12

## 2019-02-11 RX ORDER — DOCUSATE SODIUM 100 MG/1
100 CAPSULE, LIQUID FILLED ORAL 2 TIMES DAILY
Status: DISCONTINUED | OUTPATIENT
Start: 2019-02-11 | End: 2019-02-14 | Stop reason: HOSPADM

## 2019-02-11 RX ORDER — LABETALOL HYDROCHLORIDE 5 MG/ML
10 INJECTION, SOLUTION INTRAVENOUS EVERY 4 HOURS PRN
Status: DISCONTINUED | OUTPATIENT
Start: 2019-02-11 | End: 2019-02-14 | Stop reason: HOSPADM

## 2019-02-11 RX ORDER — PREGABALIN 75 MG/1
75 CAPSULE ORAL ONCE
Status: COMPLETED | OUTPATIENT
Start: 2019-02-11 | End: 2019-02-11

## 2019-02-11 RX ORDER — ONDANSETRON 2 MG/ML
4 INJECTION INTRAMUSCULAR; INTRAVENOUS ONCE AS NEEDED
Status: DISCONTINUED | OUTPATIENT
Start: 2019-02-11 | End: 2019-02-11 | Stop reason: HOSPADM

## 2019-02-11 RX ORDER — SODIUM CHLORIDE 9 MG/ML
150 INJECTION, SOLUTION INTRAVENOUS CONTINUOUS
Status: DISCONTINUED | OUTPATIENT
Start: 2019-02-11 | End: 2019-02-14 | Stop reason: HOSPADM

## 2019-02-11 RX ORDER — KETOROLAC TROMETHAMINE 30 MG/ML
15 INJECTION, SOLUTION INTRAMUSCULAR; INTRAVENOUS EVERY 6 HOURS PRN
Status: DISPENSED | OUTPATIENT
Start: 2019-02-11 | End: 2019-02-12

## 2019-02-11 RX ORDER — SODIUM CHLORIDE 0.9 % (FLUSH) 0.9 %
3-10 SYRINGE (ML) INJECTION AS NEEDED
Status: DISCONTINUED | OUTPATIENT
Start: 2019-02-11 | End: 2019-02-14 | Stop reason: HOSPADM

## 2019-02-11 RX ORDER — BISACODYL 5 MG/1
10 TABLET, DELAYED RELEASE ORAL DAILY PRN
Status: DISCONTINUED | OUTPATIENT
Start: 2019-02-11 | End: 2019-02-14 | Stop reason: HOSPADM

## 2019-02-11 RX ORDER — LIDOCAINE HYDROCHLORIDE 10 MG/ML
0.5 INJECTION, SOLUTION EPIDURAL; INFILTRATION; INTRACAUDAL; PERINEURAL ONCE AS NEEDED
Status: COMPLETED | OUTPATIENT
Start: 2019-02-11 | End: 2019-02-11

## 2019-02-11 RX ORDER — DEXAMETHASONE SODIUM PHOSPHATE 4 MG/ML
INJECTION, SOLUTION INTRA-ARTICULAR; INTRALESIONAL; INTRAMUSCULAR; INTRAVENOUS; SOFT TISSUE AS NEEDED
Status: DISCONTINUED | OUTPATIENT
Start: 2019-02-11 | End: 2019-02-11 | Stop reason: SURG

## 2019-02-11 RX ORDER — BUPIVACAINE HYDROCHLORIDE 5 MG/ML
INJECTION, SOLUTION PERINEURAL
Status: COMPLETED | OUTPATIENT
Start: 2019-02-11 | End: 2019-02-11

## 2019-02-11 RX ADMIN — CEFAZOLIN SODIUM 2 G: 2 INJECTION, SOLUTION INTRAVENOUS at 18:26

## 2019-02-11 RX ADMIN — TRANEXAMIC ACID 1000 MG: 100 INJECTION, SOLUTION INTRAVENOUS at 11:10

## 2019-02-11 RX ADMIN — DEXAMETHASONE SODIUM PHOSPHATE 4 MG: 4 INJECTION, SOLUTION INTRAMUSCULAR; INTRAVENOUS at 10:10

## 2019-02-11 RX ADMIN — DOCUSATE SODIUM 100 MG: 100 CAPSULE, LIQUID FILLED ORAL at 21:00

## 2019-02-11 RX ADMIN — SODIUM CHLORIDE, POTASSIUM CHLORIDE, SODIUM LACTATE AND CALCIUM CHLORIDE 9 ML/HR: 600; 310; 30; 20 INJECTION, SOLUTION INTRAVENOUS at 08:15

## 2019-02-11 RX ADMIN — TRANEXAMIC ACID 1000 MG: 100 INJECTION, SOLUTION INTRAVENOUS at 09:56

## 2019-02-11 RX ADMIN — SODIUM CHLORIDE 150 ML/HR: 9 INJECTION, SOLUTION INTRAVENOUS at 13:20

## 2019-02-11 RX ADMIN — PHENYLEPHRINE HYDROCHLORIDE 80 MCG: 10 INJECTION INTRAVENOUS at 10:59

## 2019-02-11 RX ADMIN — PHENYLEPHRINE HYDROCHLORIDE 80 MCG: 10 INJECTION INTRAVENOUS at 10:55

## 2019-02-11 RX ADMIN — ACETAMINOPHEN 650 MG: 325 TABLET ORAL at 14:24

## 2019-02-11 RX ADMIN — FAMOTIDINE 20 MG: 20 TABLET, FILM COATED ORAL at 08:28

## 2019-02-11 RX ADMIN — PHENYLEPHRINE HYDROCHLORIDE 80 MCG: 10 INJECTION INTRAVENOUS at 10:41

## 2019-02-11 RX ADMIN — PREGABALIN 75 MG: 75 CAPSULE ORAL at 08:28

## 2019-02-11 RX ADMIN — MUPIROCIN 10 APPLICATION: 20 OINTMENT TOPICAL at 08:27

## 2019-02-11 RX ADMIN — LIDOCAINE HYDROCHLORIDE 50 ML: 10 INJECTION, SOLUTION EPIDURAL; INFILTRATION; INTRACAUDAL; PERINEURAL at 09:57

## 2019-02-11 RX ADMIN — SODIUM CHLORIDE 150 ML/HR: 9 INJECTION, SOLUTION INTRAVENOUS at 18:29

## 2019-02-11 RX ADMIN — CEFAZOLIN SODIUM 2 G: 2 INJECTION, SOLUTION INTRAVENOUS at 09:47

## 2019-02-11 RX ADMIN — LIDOCAINE HYDROCHLORIDE 0.2 ML: 10 INJECTION, SOLUTION EPIDURAL; INFILTRATION; INTRACAUDAL; PERINEURAL at 08:15

## 2019-02-11 RX ADMIN — LIDOCAINE HYDROCHLORIDE 2 ML: 10 INJECTION, SOLUTION EPIDURAL; INFILTRATION; INTRACAUDAL; PERINEURAL at 09:54

## 2019-02-11 RX ADMIN — HYDROCODONE BITARTRATE AND ACETAMINOPHEN 1 TABLET: 5; 325 TABLET ORAL at 14:24

## 2019-02-11 RX ADMIN — SODIUM CHLORIDE, POTASSIUM CHLORIDE, SODIUM LACTATE AND CALCIUM CHLORIDE: 600; 310; 30; 20 INJECTION, SOLUTION INTRAVENOUS at 11:15

## 2019-02-11 RX ADMIN — PROPOFOL 100 MCG/KG/MIN: 10 INJECTION, EMULSION INTRAVENOUS at 09:56

## 2019-02-11 RX ADMIN — HYDROCODONE BITARTRATE AND ACETAMINOPHEN 2 TABLET: 5; 325 TABLET ORAL at 18:26

## 2019-02-11 RX ADMIN — BUPIVACAINE HYDROCHLORIDE 2 ML: 5 INJECTION, SOLUTION PERINEURAL at 09:54

## 2019-02-11 NOTE — H&P
Patient Name: Susy Fitzpatrick  MRN: [id number]  : 1950  DOS: 2019    Attending: Esdras Ramires MD    Primary Care Provider: Blayne Kirby MD      Chief complaint:  Right hip pain    Subjective   Patient is a 68 y.o. female presented for right total hip arthroplasty by Dr. Ramires.    She underwent surgery under spinal anesthesia. She tolerated surgery well and is admitted for further medical management. Her hip has been painful for about a year. She has been using a cane or crutches for ambulation. She denies recent falls.    When seen postop she is doing well. Her pain is well controlled. She denies nausea, shortness of breath or chest pain. No hx of DVT or PE.     ( Above is noted/ agree. Doing well when I saw her in her room afterwards.  Good pain control.  No nausea vomiting or shortness breath.  Already ambulated a few times.  Ambulated with physical therapy to 50 feet with a rolling walker and step through gait pattern limited by fatigue.  )wy    Allergies:  No Known Allergies    Meds:  Medications Prior to Admission   Medication Sig Dispense Refill Last Dose   • esomeprazole (nexIUM) 20 MG capsule Take 20 mg by mouth Every Morning Before Breakfast.   2/10/2019 at 0830   • ibuprofen (ADVIL,MOTRIN) 200 MG tablet Take 200 mg by mouth Every 6 (Six) Hours As Needed for Mild Pain .   2019 at 0500       History:   Past Medical History:   Diagnosis Date   • Anemia    • Arthritis     right hip    • Cancer (CMS/HCC)    • Diffuse large B cell lymphoma (CMS/HCC)    • Drug therapy     20 TREATMENTS [id number]   • GERD (gastroesophageal reflux disease)      Past Surgical History:   Procedure Laterality Date   • CHOLECYSTECTOMY     • COLONOSCOPY  2018   • ENDOSCOPY     • LYMPH NODE BIOPSY     • TONSILLECTOMY       Family History   Problem Relation Age of Onset   • Ovarian cancer Sister         unknown   • Cancer Father    • Ovarian cancer Mother         age unknown   • Breast cancer Neg Hx   "    Social History     Tobacco Use   • Smoking status: Never Smoker   • Smokeless tobacco: Never Used   Substance Use Topics   • Alcohol use: Yes     Comment: rare   • Drug use: No   She is a , lives alone. She has 1 child. She is retired from [employer].    Review of Systems  All systems were reviewed and negative except for:  Gastrointestinal: positive for  constipation    Vital Signs  Visit Vitals  /71 (BP Location: Left arm, Patient Position: Sitting)   Pulse 75   Temp 98.1 °F (36.7 °C) (Oral)   Resp 16   Ht 157.5 cm (62\")   Wt 83.4 kg (183 lb 13.8 oz)   LMP 03/20/2001 Comment: mammogram- 2018    SpO2 95%   BMI 33.63 kg/m²       Physical Exam:    General Appearance:    Alert, cooperative, in no acute distress   Head:    Normocephalic, without obvious abnormality, atraumatic   Eyes:            Lids and lashes normal, conjunctivae and sclerae normal, no   icterus, no pallor, corneas clear   Ears:    Ears appear intact with no abnormalities noted   Neck:   No adenopathy, supple, trachea midline, no thyromegaly   Lungs:     Clear to auscultation,respirations regular, even and                   unlabored    Heart:    Regular rhythm and normal rate, normal S1 and S2, no            murmur, no gallop   Abdomen:     Normal bowel sounds, no masses, no organomegaly, soft        non-tender, non-distended, no guarding, no rebound                 tenderness   Genitalia:    Deferred   Extremities:   Right hip Prineo CDI. Distally, cap refill normal.    Pulses:   Pulses palpable and equal bilaterally   Skin:   No bleeding, bruising or rash   Neurologic:   Cranial nerves 2 - 12 grossly intact, sensation intact. Flexion and dorsiflexion intact bilateral feet.        I reviewed the patient's new clinical results.     Results for VU MORALES (MRN [id number]) as of 2/11/2019 10:33   Ref. Range 1/29/2019 15:32   Glucose Latest Ref Range: 70 - 100 mg/dL 91   Sodium Latest Ref Range: 132 - 146 mmol/L 141   Potassium " Latest Ref Range: 3.5 - 5.5 mmol/L 3.7   CO2 Latest Ref Range: 20.0 - 31.0 mmol/L 31.0   Chloride Latest Ref Range: 99 - 109 mmol/L 104   Anion Gap Latest Ref Range: 3.0 - 11.0 mmol/L 6.0   Creatinine Latest Ref Range: 0.60 - 1.30 mg/dL 0.83   BUN Latest Ref Range: 9 - 23 mg/dL 17   BUN/Creatinine Ratio Latest Ref Range: 7.0 - 25.0  20.5   Calcium Latest Ref Range: 8.7 - 10.4 mg/dL 9.3   eGFR Non  Am Latest Ref Range: >60 mL/min/1.73 69   Alkaline Phosphatase Latest Ref Range: 25 - 100 U/L 104 (H)   Total Protein Latest Ref Range: 5.7 - 8.2 g/dL 6.2   ALT (SGPT) Latest Ref Range: 7 - 40 U/L 21   AST (SGOT) Latest Ref Range: 0 - 33 U/L 19   Total Bilirubin Latest Ref Range: 0.3 - 1.2 mg/dL 0.3   Albumin Latest Ref Range: 3.20 - 4.80 g/dL 4.45   Globulin Latest Units: gm/dL 1.8   A/G Ratio Latest Ref Range: 1.5 - 2.5 g/dL 2.5   Hemoglobin A1C Latest Ref Range: 4.80 - 5.60 % 5.50   C-Reactive Protein Latest Ref Range: 0.00 - 1.00 mg/dL 0.18   WBC Latest Ref Range: 3.50 - 10.80 10*3/mm3 5.33   RBC Latest Ref Range: 3.89 - 5.14 10*6/mm3 4.41   Hemoglobin Latest Ref Range: 11.5 - 15.5 g/dL 12.7   Hematocrit Latest Ref Range: 34.5 - 44.0 % 38.5   RDW Latest Ref Range: 11.3 - 14.5 % 13.4   MCV Latest Ref Range: 80.0 - 99.0 fL 87.3   MCH Latest Ref Range: 27.0 - 31.0 pg 28.8   MCHC Latest Ref Range: 32.0 - 36.0 g/dL 33.0   MPV Latest Ref Range: 6.0 - 12.0 fL 9.2   Platelets Latest Ref Range: 150 - 450 10*3/mm3 206     Assessment and Plan:     Status post total replacement of right hip    Arthritis of right hip    GERD (gastroesophageal reflux disease)      Plan  1. PT/OT- early ambulation post op  2. Pain control-prns   3. IS-encourage  4. DVT proph- mechs/ASA  5. Bowel regimen  6. Resume home medications as appropriate  7. Monitor post-op labs  8. DC planning for home    GERD  - Continue PPI    I have personally performed the evaluation on this patient. My history is consistent  with HPI obtained. My exam findings  are listed above. I have personally reviewed and discussed the above formulated treatment plan with pt and AH.[email or web address] Yeh MD  02/11/19  6:37 PM

## 2019-02-11 NOTE — ANESTHESIA PREPROCEDURE EVALUATION
Anesthesia Evaluation     Patient summary reviewed and Nursing notes reviewed   no history of anesthetic complications:  NPO Solid Status: > 8 hours  NPO Liquid Status: > 2 hours           Airway   Mallampati: II  TM distance: >3 FB  Neck ROM: full  No difficulty expected  Dental - normal exam     Pulmonary     breath sounds clear to auscultation  Cardiovascular   Exercise tolerance: good (4-7 METS)    ECG reviewed  Rhythm: regular  Rate: normal        Neuro/Psych  GI/Hepatic/Renal/Endo    (+)  GERD well controlled,      Musculoskeletal     Abdominal   (+) obese,     Abdomen: soft.   Substance History      OB/GYN          Other   (+) arthritis   history of cancer remission                    Anesthesia Plan    ASA 2     spinal     intravenous induction   Anesthetic plan, all risks, benefits, and alternatives have been provided, discussed and informed consent has been obtained with: patient.    Plan discussed with CRNA.

## 2019-02-11 NOTE — THERAPY EVALUATION
Acute Care - Physical Therapy Initial Evaluation  [facility]     Patient Name: Susy Fitzpatrick  : 1950  MRN: [id number]  Today's Date: 2019   Onset of Illness/Injury or Date of Surgery: 19  Date of Referral to PT: 19  Referring Physician: MD Ike      Admit Date: 2019    Visit Dx:     ICD-10-CM ICD-9-CM   1. Impaired functional mobility, balance, gait, and endurance Z74.09 V49.89     Patient Active Problem List   Diagnosis   • Diffuse large B-cell lymphoma of lymph nodes of neck (CMS/HCC)   • Arthritis of right hip   • GERD (gastroesophageal reflux disease)   • Status post total replacement of right hip     Past Medical History:   Diagnosis Date   • Anemia    • Arthritis     right hip    • Cancer (CMS/HCC)    • Diffuse large B cell lymphoma (CMS/HCC)    • Drug therapy     20 TREATMENTS 8583-5129   • GERD (gastroesophageal reflux disease)      Past Surgical History:   Procedure Laterality Date   • CHOLECYSTECTOMY     • COLONOSCOPY  2018   • ENDOSCOPY     • LYMPH NODE BIOPSY     • TONSILLECTOMY          PT ASSESSMENT (last 12 hours)      Physical Therapy Evaluation     Row Name 19 1446          PT Evaluation Time/Intention    Subjective Information  complains of;pain;numbness  -LR     Document Type  evaluation  -LR     Mode of Treatment  physical therapy;individual therapy  -LR     Patient Effort  excellent  -LR     Symptoms Noted During/After Treatment  none  -LR     Row Name 19 1446          General Information    Patient Profile Reviewed?  yes  -LR     Onset of Illness/Injury or Date of Surgery  19  -LR     Referring Physician  MD Ike  -LR     Patient Observations  alert;cooperative;agree to therapy  -LR     Patient/Family Observations  Daughter and friend present.   -LR     General Observations of Patient  Patient supine in bed upon arrival. IV, SCDs, ice pack to R hip.   -LR     Prior Level of Function  min assist:;all household  mobility;gait;transfer;bed mobility;ADL's;home management;cooking;cleaning;independent:;driving;dependent:;community mobility;shopping uses scooter at store;used quad cane or crutches at all time  -LR     Equipment Currently Used at Home  cane, quad;crutches, axillary used at all times  -LR     Pertinent History of Current Functional Problem  Patient presents for surgical management of persistent and progressive R hip pain and dysfunction that has failed to improve with conservative management.   -LR     Existing Precautions/Restrictions  fall;right;hip, anterior  -LR     Limitations/Impairments  -- none  -LR     Equipment Issued to Patient  -- none  -LR     Equipment Ordered for Patient  -- none  -LR     Risks Reviewed  patient and family:;LOB;nausea/vomiting;dizziness;increased discomfort;lines disloged  -LR     Benefits Reviewed  patient and family:;improve function;increase independence;increase strength;increase balance;decrease pain;decrease risk of DVT;increase knowledge  -LR     Barriers to Rehab  medically complex;previous functional deficit  -LR     Row Name 02/11/19 1446          Relationship/Environment    Primary Source of Support/Comfort  child(nhi) daughter available to assist at all times,lives next door  -LR     Lives With  alone  -LR     Row Name 02/11/19 1446          Resource/Environmental Concerns    Current Living Arrangements  home/apartment/condo  -LR     Resource/Environmental Concerns  none  -LR     Row Name 02/11/19 1446          Home Main Entrance    Number of Stairs, Main Entrance  one  -LR     Stair Railings, Main Entrance  none  -LR     Row Name 02/11/19 1446          Cognitive Assessment/Intervention- PT/OT    Orientation Status (Cognition)  oriented x 4  -LR     Follows Commands (Cognition)  WFL;follows one step commands;over 90% accuracy;verbal cues/prompting required;repetition of directions required  -LR     Safety Deficit (Cognitive)  safety precautions awareness;safety  precautions follow-through/compliance  -LR     Row Name 02/11/19 1446          Safety Issues, Functional Mobility    Safety Issues Affecting Function (Mobility)  safety precaution awareness;safety precautions follow-through/compliance  -LR     Row Name 02/11/19 1446          Mobility Assessment/Treatment    Extremity Weight-bearing Status  right lower extremity  -LR     Right Lower Extremity (Weight-bearing Status)  weight-bearing as tolerated (WBAT)  -LR     Row Name 02/11/19 1446          Bed Mobility Assessment/Treatment    Bed Mobility Assessment/Treatment  supine-sit  -LR     Supine-Sit [place] (Bed Mobility)  verbal cues;supervision  -LR     Bed Mobility, Safety Issues  decreased use of legs for bridging/pushing  -LR     Assistive Device (Bed Mobility)  head of bed elevated;bed rails  -LR     Comment (Bed Mobility)  Verbal cues to move LEs towards EOB and to push up from bed to raise trunk into sitting and to scoot hips out to get feet on floor. Patient used her arms to assist in moving R LE out of bed. C/o mild dizziness upon sitting up, improved with rest break.   -     Row Name 02/11/19 1446          Transfer Assessment/Treatment    Transfer Assessment/Treatment  sit-stand transfer;stand-sit transfer  -LR     Comment (Transfers)  Verbal cues to push up from bed to stand and to reach back for chair to lower into sitting. Verbal cues to step R LE out before t/f for comfort.   -LR     Sit-Stand [place] (Transfers)  verbal cues;contact guard;2 person assist  -LR     Stand-Sit [place] (Transfers)  verbal cues;contact guard;2 person assist  -LR     Row Name 02/11/19 1446          Sit-Stand Transfer    Assistive Device (Sit-Stand Transfers)  walker, front-wheeled  -LR     Row Name 02/11/19 1446          Stand-Sit Transfer    Assistive Device (Stand-Sit Transfers)  walker, front-wheeled  -LR     Row Name 02/11/19 1446          Gait/Stairs Assessment/Training    16816 - Gait Training Minutes   8   -LR     [place] Level (Gait)  verbal cues;contact guard;2 person assist  -LR     Assistive Device (Gait)  walker, front-wheeled  -LR     Distance in Feet (Gait)  250  -LR     Pattern (Gait)  step-through  -LR     Deviations/Abnormal Patterns (Gait)  right sided deviations;antalgic;bilateral deviations;kari decreased;gait speed decreased;stride length decreased  -LR     Bilateral Gait Deviations  forward flexed posture;heel strike decreased  -LR     Right Sided Gait Deviations  weight shift ability decreased  -LR     Comment (Gait/Stairs)  Patient ambulated with step through gait pattern at slow pace. Initially stepping past front of RW and pausing during stance phase on R. Progressed to smooth, continuous forward motion with cues for increased R LE weight bearing/stance phase, decreased UE weight bearing, and increased R hip and knee flexion during swing phase. Gait limited by fatigue.   -     Row Name 02/11/19 1446          General ROM    RT Lower Ext  Comment  -LR     LT Lower Ext  Comment  -[facility] Name 02/11/19 1446          Right Lower Ext    RT Lower Extremity Comments  R hip AROM impaired 50%  -[facility] Name 02/11/19 1446          Left Lower Ext    LT Lower Extremity Comments  L LE AROM WFL  -LR     Row Name 02/11/19 1446          MMT (Manual Muscle Testing)    Rt Lower Ext  Rt Knee WFL;Rt Ankle WFL;Comments  -LR     Lt Lower Ext  Lt Hip WFL;Lt Knee WFL;Lt Ankle WFL  -[facility] Name 02/11/19 1446          MMT Right Lower Ext    Rt Lower Extremity Comments   R hip functionally 2+/5, unable to perform SLR independently, no knee buckling with gait.   -     Row Name 02/11/19 1446          Motor Assessment/Intervention    Additional Documentation  Therapeutic Exercise (Group);Therapeutic Exercise Interventions (Group)  -     Row Name 02/11/19 1446          Therapeutic Exercise    75049 - PT Therapeutic Exercise Minutes  2  -     Row Name 02/11/19 1446          Therapeutic Exercise    Lower  Extremity (Therapeutic Exercise)  gluteal sets;quad sets, right  -LR     Lower Extremity Range of Motion (Therapeutic Exercise)  ankle dorsiflexion/plantar flexion, right  -LR     Exercise Type (Therapeutic Exercise)  AROM (active range of motion);isometric contraction, static;isotonic contraction, concentric  -LR     Position (Therapeutic Exercise)  supine  -LR     Sets/Reps (Therapeutic Exercise)  x10 reps each  -LR     Comment (Therapeutic Exercise)  cues for technique; able to actively DF bilaterally  -LR     Row Name 02/11/19 1446          Sensory Assessment/Intervention    Sensory General Assessment  other (see comments);light touch sensation deficits identified c/o mild numbness R ankle  -LR     Row Name 02/11/19 1446          Light Touch Sensation Assessment    Right Lower Extremity: Light Touch Sensation Assessment  mild impairment, 75% or more correct responses  -LR     Comment, Right Lower Extremity: Light Touch Sensation Assessment  R ankle  -LR     Row Name 02/11/19 1446          Vision Assessment/Intervention    Visual Impairment/Limitations  corrective lenses full time  -LR     Row Name 02/11/19 1446          Pain Assessment    Additional Documentation  Pain Scale: Numbers Pre/Post-Treatment (Group)  -LR     Row Name 02/11/19 1446          Pain Scale: Numbers Pre/Post-Treatment    Pain Scale: Numbers, Pretreatment  6/10  -LR     Pain Scale: Numbers, Post-Treatment  6/10  -LR     Pain Location - Side  Right  -LR     Pain Location  hip  -LR     Pain Intervention(s)  Repositioned;Ambulation/increased activity;Cold pack  -LR     Row Name             Wound 02/11/19 1028 Right hip incision    Wound - Properties Group Date first assessed: 02/11/19  -RV Time first assessed: 1028  -RV Side: Right  -RV Location: hip  -RV Type: incision  -RV    Row Name 02/11/19 1446          Coping    Observed Emotional State  accepting;cooperative  -LR     Row Name 02/11/19 1446          Plan of Care Review    Plan of Care  Reviewed With  patient;daughter;josh  -LR     Row Name 02/11/19 1446          Physical Therapy Clinical Impression    Date of Referral to PT  02/11/19  -LR     PT Diagnosis (PT Clinical Impression)  s/p elective R WENDY via anterior approach  -LR     Prognosis (PT Clinical Impression)  good  -LR     Patient/Family Goals Statement (PT Clinical Impression)  go home, decrease pain  -LR     Criteria for Skilled Interventions Met (PT Clinical Impression)  yes;treatment indicated  -LR     Rehab Potential (PT Clinical Summary)  good, to achieve stated therapy goals  -LR     Care Plan Review (PT)  evaluation/treatment results reviewed;care plan/treatment goals reviewed;risks/benefits reviewed;current/potential barriers reviewed;patient/other agree to care plan  -LR     Care Plan Review, Other Participant (PT Clinical Impression)  daughter;josh  -LR     Row Name 02/11/19 1446          Physical Therapy Goals    Bed Mobility Goal Selection (PT)  bed mobility, PT goal 1  -LR     Transfer Goal Selection (PT)  transfer, PT goal 1  -LR     Gait Training Goal Selection (PT)  gait training, PT goal 1  -LR     Stairs Goal Selection (PT)  stairs, PT goal 1  -LR     Additional Documentation  Stairs Goal Selection (PT) (Row)  -LR     Row Name 02/11/19 1446          Bed Mobility Goal 1 (PT)    Activity/Assistive Device (Bed Mobility Goal 1, PT)  sit to supine/supine to sit  -LR     [place] Level/Cues Needed (Bed Mobility Goal 1, PT)  conditional independence  -LR     Time Frame (Bed Mobility Goal 1, PT)  long term goal (LTG);3 days  -LR     Progress/Outcomes (Bed Mobility Goal 1, PT)  goal ongoing  -LR     Row Name 02/11/19 1446          Transfer Goal 1 (PT)    Activity/Assistive Device (Transfer Goal 1, PT)  sit-to-stand/stand-to-sit;walker, rolling  -LR     [place] Level/Cues Needed (Transfer Goal 1, PT)  conditional independence  -LR     Time Frame (Transfer Goal 1, PT)  long term goal (LTG);3 days  -LR      Progress/Outcome (Transfer Goal 1, PT)  goal ongoing  -LR     Row Name 02/11/19 1446          Gait Training Goal 1 (PT)    Activity/Assistive Device (Gait Training Goal 1, PT)  gait (walking locomotion);walker, rolling  -LR     [place] Level (Gait Training Goal 1, PT)  conditional independence  -LR     Distance (Gait Goal 1, PT)  500 feet  -LR     Time Frame (Gait Training Goal 1, PT)  long term goal (LTG);3 days  -LR     Progress/Outcome (Gait Training Goal 1, PT)  goal ongoing  -LR     Row Name 02/11/19 1446          Stairs Goal 1 (PT)    Activity/Assistive Device (Stairs Goal 1, PT)  ascending stairs;descending stairs;step-to-step;walker, rolling;other (see comments) backwards  -LR     [place] Level/Cues Needed (Stairs Goal 1, PT)  contact guard assist  -LR     Number of Stairs (Stairs Goal 1, PT)  1  -LR     Time Frame (Stairs Goal 1, PT)  long term goal (LTG);3 days  -LR     Progress/Outcome (Stairs Goal 1, PT)  goal ongoing  -LR     Row Name 02/11/19 1446          Positioning and Restraints    Pre-Treatment Position  in bed  -LR     Post Treatment Position  chair  -LR     In Chair  notified nsg;reclined;sitting;call light within reach;encouraged to call for assist;exit alarm on;with family/caregiver;compression device;legs elevated  -LR     Row Name 02/11/19 1446          Living Environment    Home Accessibility  not wheelchair accessible;stairs to enter home;tub/shower is not walk in  -LR       User Key  (r) = Recorded By, (t) = Taken By, (c) = Cosigned By    Initials Name Provider Type    Tiesha Jim, PT Physical Therapist    Abram Roe, RN Registered Nurse        Physical Therapy Education     Title: PT OT SLP Therapies (Done)     Topic: Physical Therapy (Done)     Point: Mobility training (Done)     Learning Progress Summary           Patient Acceptance, E,D, VU,NR by LR at 2/11/2019  2:46 PM    Comment:  Educated on hip precautions, weight bearing status, correct  supine to sit t/f technique, correct sit<->stand t/f technique, correct gait mechanics, and progression of POC.   Family Acceptance, E,D, VU,NR by LR at 2/11/2019  2:46 PM    Comment:  Educated on hip precautions, weight bearing status, correct supine to sit t/f technique, correct sit<->stand t/f technique, correct gait mechanics, and progression of POC.                   Point: Home exercise program (Done)     Learning Progress Summary           Patient Acceptance, E,D, VU,NR by LR at 2/11/2019  2:46 PM    Comment:  Educated on hip precautions, weight bearing status, correct supine to sit t/f technique, correct sit<->stand t/f technique, correct gait mechanics, and progression of POC.   Family Acceptance, E,D, VU,NR by LR at 2/11/2019  2:46 PM    Comment:  Educated on hip precautions, weight bearing status, correct supine to sit t/f technique, correct sit<->stand t/f technique, correct gait mechanics, and progression of POC.                   Point: Body mechanics (Done)     Learning Progress Summary           Patient Acceptance, E,D, VU,NR by LR at 2/11/2019  2:46 PM    Comment:  Educated on hip precautions, weight bearing status, correct supine to sit t/f technique, correct sit<->stand t/f technique, correct gait mechanics, and progression of POC.   Family Acceptance, E,D, VU,NR by LR at 2/11/2019  2:46 PM    Comment:  Educated on hip precautions, weight bearing status, correct supine to sit t/f technique, correct sit<->stand t/f technique, correct gait mechanics, and progression of POC.                   Point: Precautions (Done)     Learning Progress Summary           Patient Acceptance, E,D, VU,NR by LR at 2/11/2019  2:46 PM    Comment:  Educated on hip precautions, weight bearing status, correct supine to sit t/f technique, correct sit<->stand t/f technique, correct gait mechanics, and progression of POC.   Family Acceptance, E,D, VU,NR by LR at 2/11/2019  2:46 PM    Comment:  Educated on hip precautions,  weight bearing status, correct supine to sit t/f technique, correct sit<->stand t/f technique, correct gait mechanics, and progression of POC.                               User Key     Initials Effective Dates Name Provider Type Discipline    LR 06/19/15 -  Tiesha Fernandez, PT Physical Therapist PT              PT Recommendation and Plan  Anticipated Discharge Disposition (PT): home with assist, home with home health  Planned Therapy Interventions (PT Eval): balance training, bed mobility training, gait training, home exercise program, patient/family education, ROM (range of motion), stair training, strengthening, transfer training  Therapy Frequency (PT Clinical Impression): 2 times/day  Outcome Summary/Treatment Plan (PT)  Anticipated Equipment Needs at Discharge (PT): front wheeled walker, bedside commode  Anticipated Discharge Disposition (PT): home with assist, home with home health  Plan of Care Reviewed With: patient, daughter, friend  Progress: improving  Outcome Summary: Patient ambulated 250 feet with RW and step through gait pattern, limited by fatigue. Plan is d/c home with family and HHPT. Encouraged patient to ambulate again with nursing tonight. Recommend OT consult, has ordered adaptive equipment. Will continue to progress mobility training, strengthening, and ROM as able. PADD score of 9.  Outcome Measures     Row Name 02/11/19 1446             How much help from another person do you currently need...    Turning from your back to your side while in flat bed without using bedrails?  3  -LR      Moving from lying on back to sitting on the side of a flat bed without bedrails?  3  -LR      Moving to and from a bed to a chair (including a wheelchair)?  3  -LR      Standing up from a chair using your arms (e.g., wheelchair, bedside chair)?  3  -LR      Climbing 3-5 steps with a railing?  3  -LR      To walk in hospital room?  3  -LR      AM-PAC 6 Clicks Score  18  -LR         Functional  Assessment    Outcome Measure Options  AM-PAC 6 Clicks Basic Mobility (PT)  -LR        User Key  (r) = Recorded By, (t) = Taken By, (c) = Cosigned By    Initials Name Provider Type    Tiesha Jim, PT Physical Therapist         Time Calculation:   PT Charges     Row Name 02/11/19 1446             Time Calculation    Start Time  1446  -LR      PT Received On  02/11/19  -LR      PT Goal Re-Cert Due Date  02/21/19  -LR         Time Calculation- PT    Total Timed Code Minutes- PT  10 minute(s)  -LR         Timed Charges    99469 - PT Therapeutic Exercise Minutes  2  -LR      83023 - Gait Training Minutes   8  -LR        User Key  (r) = Recorded By, (t) = Taken By, (c) = Cosigned By    Initials Name Provider Type    Tiesha Jim, PT Physical Therapist        Therapy Suggested Charges     Code   Minutes Charges    21246 (CPT®) Hc Pt Neuromusc Re Education Ea 15 Min      66990 (CPT®) Hc Pt Ther Proc Ea 15 Min 2     48921 (CPT®) Hc Gait Training Ea 15 Min 8 1    71427 (CPT®) Hc Pt Therapeutic Act Ea 15 Min      19240 (CPT®) Hc Pt Manual Therapy Ea 15 Min      81702 (CPT®) Hc Pt Iontophoresis Ea 15 Min      41288 (CPT®) Hc Pt Elec Stim Ea-Per 15 Min      10436 (CPT®) Hc Pt Ultrasound Ea 15 Min      68837 (CPT®) Hc Pt Self Care/Mgmt/Train Ea 15 Min      22245 (CPT®) Hc Pt Prosthetic (S) Train Initial Encounter, Each 15 Min      27592 (CPT®) Hc Pt Orthotic(S)/Prosthetic(S) Encounter, Each 15 Min      58453 (CPT®) Hc Orthotic(S) Mgmt/Train Initial Encounter, Each 15min      Total  10 1        Therapy Charges for Today     Code Description Service Date Service Provider Modifiers Qty    81552059104 HC GAIT TRAINING EA 15 MIN 2/11/2019 Tiesha Fernandez, PT GP 1    93999535071 HC PT THER SUPP EA 15 MIN 2/11/2019 Tiesha Fernandez, PT GP 2    64853261150 HC PT EVAL LOW COMPLEXITY 3 2/11/2019 Tiesha Fernandez, PT GP 1          PT G-Codes  Outcome Measure Options: AM-PAC 6 Clicks Basic  Mobility (PT)  AM-PAC 6 Clicks Score: 18      Tiesha Fernandez, PT  2/11/2019

## 2019-02-11 NOTE — OP NOTE
TOTAL HIP ARTHROPLASTY ANTERIOR  Progress Note    Susy Sue Nanette  2/11/2019    Pre-op Diagnosis:   Right hip OA       Post-Op Diagnosis Codes:  Right hip OA    Procedure/CPT® Codes:  62929    Procedure(s):  TOTAL RIGHT HIP ARTHROPLASTY ANTERIOR    Surgeon(s):  Esdras Ramires MD    Anesthesia: Spinal    Staff:   Circulator: Abram Zheng RN  Radiology Technologist: Susy Kirk RT  Scrub Person: Indra Stevens; Vicente Cam  Vendor Representative: Ish Mtz  Nursing Assistant: Gonzales Osman  Assistant: Molly Christianson PA    Estimated Blood Loss: 200 mL    Urine Voided: * No values recorded between 2/11/2019  9:47 AM and 2/11/2019 11:29 AM *    Specimens:                None      Drains:      Findings: Circumferential femoral head excoriations    Complications: None    Implants: [employer] R3 56 acetabular cup; screw x3; N/N poly liner             Polarstem press-fit femur size 4 KA with +4 /36 neck/head adapter      Indications:  68-year-old woman with end-stage right hip osteoarthritis symptoms. X-rays show complete loss superior joint space. Pain is limiting routine daily activities. Risks benefits indications and rationale for total hip arthroplasty discussed at length with patient. She signs her own consent after all questions are answered.      Procedure:  While in the OR spinal anesthetic achieved with satisfactory level. Turned to the supine position and prepped and draped in standard fashion for anterior approach to the left hip on the [place] table. Fluoroscopy used to assess limb lengths. These were restored with final component selection and position. Incision made over the tensor fascia indu and routine dissection carried down to raise the fascia over the medial margin of the tensor muscle belly. Circumflex vessels were identified and cauterized. Bleeding reasonably controlled with estimated total blood loss 250 mL. Femoral neck isolated. T-shaped capsulotomy created. Mild effusion  relieved. Standard neck cut made and head removed without difficulty noting diffuse femoral head excoriations. Acetabulum was exposed circumferentially. Marginal osteophytes removed. Acetabulum reamed from size 47 to size 55 noting satisfactory exposure of the subchondral bone with the size 55. Size 56 final component was seated with marginal press-fit characteristics. 3 additional fixation screws were required, all placed with optimal bony purchase. Polyethylene insert placed without difficulty. Horizontal tilt was thought to be approximately 35-40° from Hilgenreiner's line. Anteversion approximately 25°. Component was just under the anterior inferior acetabular margin. Acetabulum was thoroughly irrigated before during and after component placement.      Attention turned to the proximal femur. Trochanteric hook placed and hip externally rotated eventually to 160° after complete posterior lateral releases. Standard piriformis landmarks were used. Broached from size 0/1 to size 4 with good position relative to standard landmarks. Calcar reamer was not required. Final component seated completely relative to the broach with excellent rotational stability. Limb lengths appeared to be restored best with +4 x36 mm head and neck adapter. Final components assembled and reduced. Wound thoroughly irrigated and closed in layers without a drain. Bleeding appeared to be well controlled at closure. Joint space injected with ropivacaine through a spinal needle placed percutaneously during closure. Standard Prineo dressing applied. Final counts were correct. Patient stable to recovery having tolerated procedure well throughout.          Esdras Ramires MD    Date: 2/11/2019  Time: 11:31 AM

## 2019-02-11 NOTE — PLAN OF CARE
Problem: Patient Care Overview  Goal: Plan of Care Review  Outcome: Ongoing (interventions implemented as appropriate)   02/11/19 9266   Plan of Care Review   Progress improving   OTHER   Outcome Summary Patient ambulated 250 feet with RW and step through gait pattern, limited by fatigue. Plan is d/c home with family and HHPT. Encouraged patient to ambulate again with nursing tonight. Recommend OT consult, has ordered adaptive equipment. Will continue to progress mobility training, strengthening, and ROM as able. PADD score of 9.   Coping/Psychosocial   Plan of Care Reviewed With patient;daughter;friend

## 2019-02-11 NOTE — H&P
"Pre-Op H&P  Susy Fitzpatrick  [id number]  1950    Chief complaint: Right hip pain    HPI:    Patient is a 68 y.o.female who presents with right hip pain and found to have osteoarthritis of right hip and here today for right anterior total hip arthroplasty     Review of Systems:  General ROS: negative for chills, fever or skin lesions;  No changes since last office visit  Cardiovascular ROS: no chest pain or dyspnea on exertion  Respiratory ROS: no cough, shortness of breath, or wheezing    Allergies: No Known Allergies    Home Meds:    No current facility-administered medications on file prior to encounter.      Current Outpatient Medications on File Prior to Encounter   Medication Sig Dispense Refill   • esomeprazole (nexIUM) 20 MG capsule Take 20 mg by mouth Every Morning Before Breakfast.         PMH:   Past Medical History:   Diagnosis Date   • Anemia    • Arthritis     right hip    • Cancer (CMS/HCC) 2008   • Diffuse large B cell lymphoma (CMS/HCC) 2008   • Drug therapy     20 TREATMENTS [id number]   • GERD (gastroesophageal reflux disease)      PSH:    Past Surgical History:   Procedure Laterality Date   • CHOLECYSTECTOMY     • COLONOSCOPY  08/2018   • ENDOSCOPY     • LYMPH NODE BIOPSY     • TONSILLECTOMY       Social History:   Tobacco:   Social History     Tobacco Use   Smoking Status Never Smoker   Smokeless Tobacco Never Used      Alcohol:     Social History     Substance and Sexual Activity   Alcohol Use Yes    Comment: rare       Vitals:           /90 (BP Location: Right arm, Patient Position: Lying)   Pulse 69   Temp 98.4 °F (36.9 °C) (Temporal)   Resp 18   Ht 157.5 cm (62\")   Wt 83.4 kg (183 lb 13.8 oz)   LMP 03/20/2001 Comment: mammogram- 2018   SpO2 97%   BMI 33.63 kg/m²     Physical Exam:  General Appearance:    Alert, cooperative, no distress, appears stated age   Head:    Normocephalic, without obvious abnormality, atraumatic   Lungs:     Clear to auscultation bilaterally, " respirations unlabored    Heart:   Regular rate and rhythm, S1 and S2 normal, no murmur, rub    or gallop    Abdomen:    Soft, non-tender.  +bowel sounds   Breast Exam:    deferred   Genitalia:    deferred   Extremities:   Extremities normal, atraumatic, no cyanosis or edema   Skin:   Skin color, texture, turgor normal, no rashes or lesions   Neurologic:   Grossly intact   Results Review  I reviewed the patient's new clinical results.    Cancer Staging (if applicable)  Cancer Patient: __ yes _x_no __unknown; If yes, clinical stage T:__ N:__M:__, stage group or __N/A    Impression: Osteoarthritis right hip    Plan: Right anterior total hip arthroplasty    Farideh Hooper, APRN   2/11/2019   8:37 AM

## 2019-02-11 NOTE — ANESTHESIA PROCEDURE NOTES
Spinal Block      Patient reassessed immediately prior to procedure    Patient location during procedure: OR  Indication:at surgeon's request  Performed By  CRNA: Seb Rosas CRNA  Preanesthetic Checklist  Completed: patient identified, site marked, surgical consent, pre-op evaluation, timeout performed, IV checked, risks and benefits discussed and monitors and equipment checked  Spinal Block Prep:  Patient Position:sitting  Sterile Tech:cap, gloves, sterile barriers and mask  Prep:Chloraprep  Patient Monitoring:blood pressure monitoring, continuous pulse oximetry and EKG  Spinal Block Procedure  Approach:midline  Guidance:landmark technique and palpation technique  Location:L4-L5  Needle Type:Ricky  Needle Gauge:25 G  Placement of Spinal needle event:cerebrospinal fluid aspirated  Paresthesia: no  Fluid Appearance:clear  Medications: bupivacaine (MARCAINE) injection 0.5%, 2 mL  Med Administered at 2/11/2019 9:54 AM   Post Assessment  Patient Tolerance:patient tolerated the procedure well with no apparent complications  Complications no  Additional Notes  Procedure:  Pt assisted to sitting position, with legs in position of comfort over side of bed.  Pt. instructed in optimal spine presentation, the spine was prepped/ Draped and the skin at insertion site was anesthetized with 1% Lidocaine 2 ml.  The spinal needle was then advanced until CSF flow was obtained and LA was injected:

## 2019-02-11 NOTE — ANESTHESIA POSTPROCEDURE EVALUATION
Patient: Susy Fitzpatrick    Procedure Summary     Date:  02/11/19 Room / Location:   [facility] OR 19 /  [facility] OR    Anesthesia Start:  0947 Anesthesia Stop:      Procedure:  TOTAL RIGHT HIP ARTHROPLASTY ANTERIOR (Right Hip) Diagnosis:      Surgeon:  Esdras Ramires MD Provider:  Seb Collins MD    Anesthesia Type:  spinal ASA Status:  2          Anesthesia Type: spinal  Last vitals  BP 88/50  144/90 (02/11/19 0819)   Temp 98  98.4 °F (36.9 °C) (02/11/19 0819)   Pulse 94  69 (02/11/19 0819)   Resp 18  18 (02/11/19 0819)     SpO2 90  97 % (02/11/19 0819)     Post Anesthesia Care and Evaluation    Patient location during evaluation: PACU  Patient participation: complete - patient participated  Level of consciousness: awake and alert  Pain score: 0  Pain management: adequate  Airway patency: patent  Anesthetic complications: No anesthetic complications  PONV Status: none  Cardiovascular status: hemodynamically stable and acceptable  Respiratory status: nonlabored ventilation, acceptable and nasal cannula  Hydration status: acceptable    Bp is after 80mcg blanca iv push

## 2019-02-12 ENCOUNTER — APPOINTMENT (OUTPATIENT)
Dept: CT IMAGING | Facility: HOSPITAL | Age: 69
End: 2019-02-12

## 2019-02-12 PROBLEM — G89.18 ACUTE POSTOPERATIVE PAIN: Status: ACTIVE | Noted: 2019-02-12

## 2019-02-12 PROBLEM — D62 ACUTE BLOOD LOSS ANEMIA: Status: ACTIVE | Noted: 2019-02-12

## 2019-02-12 LAB
ANION GAP SERPL CALCULATED.3IONS-SCNC: 6 MMOL/L (ref 3–11)
BASOPHILS # BLD AUTO: 0.02 10*3/MM3 (ref 0–0.2)
BASOPHILS NFR BLD AUTO: 0.3 % (ref 0–1)
BNP SERPL-MCNC: 33 PG/ML (ref 0–100)
BUN BLD-MCNC: 14 MG/DL (ref 9–23)
BUN/CREAT SERPL: 17.1 (ref 7–25)
CALCIUM SPEC-SCNC: 9 MG/DL (ref 8.7–10.4)
CHLORIDE SERPL-SCNC: 103 MMOL/L (ref 99–109)
CK MB SERPL-CCNC: 6.11 NG/ML (ref 0–5)
CK MB SERPL-RTO: 0.8 % (ref 0–3)
CK SERPL-CCNC: 728 U/L (ref 26–174)
CO2 SERPL-SCNC: 27 MMOL/L (ref 20–31)
CREAT BLD-MCNC: 0.82 MG/DL (ref 0.6–1.3)
D-LACTATE SERPL-SCNC: 1.9 MMOL/L (ref 0.5–2)
DEPRECATED RDW RBC AUTO: 43.7 FL (ref 37–54)
DEPRECATED RDW RBC AUTO: 43.8 FL (ref 37–54)
EOSINOPHIL # BLD AUTO: 0.02 10*3/MM3 (ref 0–0.3)
EOSINOPHIL NFR BLD AUTO: 0.3 % (ref 0–3)
ERYTHROCYTE [DISTWIDTH] IN BLOOD BY AUTOMATED COUNT: 13.6 % (ref 11.3–14.5)
ERYTHROCYTE [DISTWIDTH] IN BLOOD BY AUTOMATED COUNT: 13.7 % (ref 11.3–14.5)
GFR SERPL CREATININE-BSD FRML MDRD: 70 ML/MIN/1.73
GLUCOSE BLD-MCNC: 140 MG/DL (ref 70–100)
GLUCOSE BLDC GLUCOMTR-MCNC: 125 MG/DL (ref 70–130)
HCT VFR BLD AUTO: 28.4 % (ref 34.5–44)
HCT VFR BLD AUTO: 29.7 % (ref 34.5–44)
HCT VFR BLD AUTO: 30.2 % (ref 34.5–44)
HGB BLD-MCNC: 9.3 G/DL (ref 11.5–15.5)
HGB BLD-MCNC: 9.8 G/DL (ref 11.5–15.5)
HGB BLD-MCNC: 9.9 G/DL (ref 11.5–15.5)
IMM GRANULOCYTES # BLD AUTO: 0.01 10*3/MM3 (ref 0–0.05)
IMM GRANULOCYTES NFR BLD AUTO: 0.1 % (ref 0–0.6)
LYMPHOCYTES # BLD AUTO: 1.53 10*3/MM3 (ref 0.6–4.8)
LYMPHOCYTES NFR BLD AUTO: 21.4 % (ref 24–44)
MAGNESIUM SERPL-MCNC: 1.7 MG/DL (ref 1.3–2.7)
MCH RBC QN AUTO: 28.8 PG (ref 27–31)
MCH RBC QN AUTO: 28.9 PG (ref 27–31)
MCHC RBC AUTO-ENTMCNC: 32.8 G/DL (ref 32–36)
MCHC RBC AUTO-ENTMCNC: 33 G/DL (ref 32–36)
MCV RBC AUTO: 87.4 FL (ref 80–99)
MCV RBC AUTO: 88 FL (ref 80–99)
MONOCYTES # BLD AUTO: 0.3 10*3/MM3 (ref 0–1)
MONOCYTES NFR BLD AUTO: 4.2 % (ref 0–12)
NEUTROPHILS # BLD AUTO: 5.28 10*3/MM3 (ref 1.5–8.3)
NEUTROPHILS NFR BLD AUTO: 73.8 % (ref 41–71)
PHOSPHATE SERPL-MCNC: 3.3 MG/DL (ref 2.4–5.1)
PLATELET # BLD AUTO: 160 10*3/MM3 (ref 150–450)
PLATELET # BLD AUTO: 172 10*3/MM3 (ref 150–450)
PMV BLD AUTO: 9.2 FL (ref 6–12)
PMV BLD AUTO: 9.3 FL (ref 6–12)
POTASSIUM BLD-SCNC: 4.3 MMOL/L (ref 3.5–5.5)
RBC # BLD AUTO: 3.4 10*6/MM3 (ref 3.89–5.14)
RBC # BLD AUTO: 3.43 10*6/MM3 (ref 3.89–5.14)
SODIUM BLD-SCNC: 136 MMOL/L (ref 132–146)
TROPONIN I SERPL-MCNC: <0.006 NG/ML
WBC NRBC COR # BLD: 7.15 10*3/MM3 (ref 3.5–10.8)
WBC NRBC COR # BLD: 7.99 10*3/MM3 (ref 3.5–10.8)

## 2019-02-12 PROCEDURE — 93005 ELECTROCARDIOGRAM TRACING: CPT | Performed by: INTERNAL MEDICINE

## 2019-02-12 PROCEDURE — 25010000002 MAGNESIUM SULFATE 2 GM/50ML SOLUTION: Performed by: INTERNAL MEDICINE

## 2019-02-12 PROCEDURE — 80048 BASIC METABOLIC PNL TOTAL CA: CPT | Performed by: NURSE PRACTITIONER

## 2019-02-12 PROCEDURE — 84484 ASSAY OF TROPONIN QUANT: CPT | Performed by: INTERNAL MEDICINE

## 2019-02-12 PROCEDURE — 97110 THERAPEUTIC EXERCISES: CPT

## 2019-02-12 PROCEDURE — 82550 ASSAY OF CK (CPK): CPT | Performed by: INTERNAL MEDICINE

## 2019-02-12 PROCEDURE — 70450 CT HEAD/BRAIN W/O DYE: CPT

## 2019-02-12 PROCEDURE — 93010 ELECTROCARDIOGRAM REPORT: CPT | Performed by: INTERNAL MEDICINE

## 2019-02-12 PROCEDURE — 82962 GLUCOSE BLOOD TEST: CPT

## 2019-02-12 PROCEDURE — 85018 HEMOGLOBIN: CPT | Performed by: NURSE PRACTITIONER

## 2019-02-12 PROCEDURE — 85014 HEMATOCRIT: CPT | Performed by: NURSE PRACTITIONER

## 2019-02-12 PROCEDURE — 85027 COMPLETE CBC AUTOMATED: CPT | Performed by: INTERNAL MEDICINE

## 2019-02-12 PROCEDURE — 94799 UNLISTED PULMONARY SVC/PX: CPT

## 2019-02-12 PROCEDURE — 97116 GAIT TRAINING THERAPY: CPT

## 2019-02-12 PROCEDURE — 85025 COMPLETE CBC W/AUTO DIFF WBC: CPT | Performed by: ORTHOPAEDIC SURGERY

## 2019-02-12 PROCEDURE — 97535 SELF CARE MNGMENT TRAINING: CPT

## 2019-02-12 PROCEDURE — 84100 ASSAY OF PHOSPHORUS: CPT | Performed by: INTERNAL MEDICINE

## 2019-02-12 PROCEDURE — 83880 ASSAY OF NATRIURETIC PEPTIDE: CPT | Performed by: INTERNAL MEDICINE

## 2019-02-12 PROCEDURE — 97165 OT EVAL LOW COMPLEX 30 MIN: CPT

## 2019-02-12 PROCEDURE — 83605 ASSAY OF LACTIC ACID: CPT | Performed by: INTERNAL MEDICINE

## 2019-02-12 PROCEDURE — 25010000003 CEFAZOLIN IN DEXTROSE 2-4 GM/100ML-% SOLUTION: Performed by: ORTHOPAEDIC SURGERY

## 2019-02-12 PROCEDURE — 25010000002 KETOROLAC TROMETHAMINE PER 15 MG: Performed by: ORTHOPAEDIC SURGERY

## 2019-02-12 PROCEDURE — 82553 CREATINE MB FRACTION: CPT | Performed by: INTERNAL MEDICINE

## 2019-02-12 PROCEDURE — 83735 ASSAY OF MAGNESIUM: CPT | Performed by: INTERNAL MEDICINE

## 2019-02-12 PROCEDURE — 97530 THERAPEUTIC ACTIVITIES: CPT

## 2019-02-12 RX ORDER — HYDROCODONE BITARTRATE AND ACETAMINOPHEN 5; 325 MG/1; MG/1
1 TABLET ORAL EVERY 4 HOURS PRN
Qty: 40 TABLET | Refills: 0 | Status: SHIPPED | OUTPATIENT
Start: 2019-02-12 | End: 2019-02-21

## 2019-02-12 RX ORDER — MAGNESIUM SULFATE HEPTAHYDRATE 40 MG/ML
2 INJECTION, SOLUTION INTRAVENOUS ONCE
Status: COMPLETED | OUTPATIENT
Start: 2019-02-12 | End: 2019-02-13

## 2019-02-12 RX ADMIN — DOCUSATE SODIUM 100 MG: 100 CAPSULE, LIQUID FILLED ORAL at 09:49

## 2019-02-12 RX ADMIN — ACETAMINOPHEN 650 MG: 325 TABLET ORAL at 07:15

## 2019-02-12 RX ADMIN — SODIUM CHLORIDE 150 ML/HR: 9 INJECTION, SOLUTION INTRAVENOUS at 16:27

## 2019-02-12 RX ADMIN — HYDROCODONE BITARTRATE AND ACETAMINOPHEN 1 TABLET: 5; 325 TABLET ORAL at 22:44

## 2019-02-12 RX ADMIN — ASPIRIN 325 MG: 325 TABLET, DELAYED RELEASE ORAL at 09:49

## 2019-02-12 RX ADMIN — PANTOPRAZOLE SODIUM 40 MG: 40 TABLET, DELAYED RELEASE ORAL at 07:15

## 2019-02-12 RX ADMIN — HYDROCODONE BITARTRATE AND ACETAMINOPHEN 1 TABLET: 5; 325 TABLET ORAL at 09:49

## 2019-02-12 RX ADMIN — ACETAMINOPHEN 650 MG: 325 TABLET ORAL at 00:46

## 2019-02-12 RX ADMIN — DOCUSATE SODIUM 100 MG: 100 CAPSULE, LIQUID FILLED ORAL at 22:44

## 2019-02-12 RX ADMIN — MAGNESIUM SULFATE HEPTAHYDRATE 2 G: 40 INJECTION, SOLUTION INTRAVENOUS at 22:44

## 2019-02-12 RX ADMIN — KETOROLAC TROMETHAMINE 15 MG: 30 INJECTION, SOLUTION INTRAMUSCULAR; INTRAVENOUS at 15:04

## 2019-02-12 RX ADMIN — ACETAMINOPHEN 650 MG: 325 TABLET ORAL at 22:44

## 2019-02-12 RX ADMIN — HYDROCODONE BITARTRATE AND ACETAMINOPHEN 1 TABLET: 5; 325 TABLET ORAL at 13:33

## 2019-02-12 RX ADMIN — CEFAZOLIN SODIUM 2 G: 2 INJECTION, SOLUTION INTRAVENOUS at 01:01

## 2019-02-12 RX ADMIN — KETOROLAC TROMETHAMINE 15 MG: 30 INJECTION, SOLUTION INTRAMUSCULAR; INTRAVENOUS at 22:44

## 2019-02-12 RX ADMIN — HYDROCODONE BITARTRATE AND ACETAMINOPHEN 1 TABLET: 5; 325 TABLET ORAL at 00:46

## 2019-02-12 NOTE — PROGRESS NOTES
Discharge Planning Assessment  [facility]     Patient Name: Susy Fitzpatrick  MRN: [id number]  Today's Date: 2/12/2019    Admit Date: 2/11/2019    Discharge Needs Assessment     Row Name 02/12/19 1241       Living Environment    Lives With  alone    Current Living Arrangements  home/apartment/condo    Family Caregiver if Needed  child(nhi), adult;sibling(s)    Family Caregiver Names  Daughter:  Carolyn Foley and Sister, Char.    Quality of Family Relationships  helpful;involved;supportive    Able to Return to Prior Arrangements  yes    Living Arrangement Comments  Ms. Fitzpatrick lives alone in a one story home, one step to enter, in [facility].  She stated that her daughter and sister will be staying with her at her home to assist her as needed.       Resource/Environmental Concerns    Transportation Concerns  car, none       Transition Planning    Patient/Family Anticipates Transition to  home with family    Transportation Anticipated  family or friend will provide       Discharge Needs Assessment    Equipment Currently Used at Home  cane, quad;crutches, axillary    Equipment Needed After Discharge  walker, rolling;commode    Outpatient/Agency/Support Group Needs  homecare agency    Discharge Facility/Level of Care Needs  home with home health    Offered/Gave Vendor List  yes    Patient's Choice of Community Agency(s)  [facility]        Discharge Plan     Row Name 02/12/19 1248       Plan    Plan  Home with family asssitance and [facility]    Patient/Family in Agreement with Plan  yes    Plan Comments  Met with Ms. Fitzpatrick, sister, Char and daughter, Carolyn, at the bedside for discharge planning.  Ms. Fitzpatrick is ambulating with a rolling walker.  She requested a rolling walker and bedside commode for home use. and did not have preference for provider.  Contacted [facility] and they will be delivering the walker and commode to the hospital room today.  Discussed physical therapy and Ms. Fitzpatrick requested  [facility].  Referral given to Christianne with [facility].  Ms. Fitzpatrick stated that will have sufficient assistance at home by her family.  Her daughter will be transporting her home by personal vehicle when discharged.  CM will continue to follow.    Final Discharge Disposition Code  06 - home with home health care        Destination      No service coordination in this encounter.      Durable Medical Equipment - Selection Complete      Service Provider Request Status Selected Services Address Phone Number Fax Number    [facility] Selected Durable Medical Equipment [address] [facility] [id number] [address] [phone]      Dialysis/Infusion      No service coordination in this encounter.      Home Medical Care - Selection Complete      Service Provider Request Status Selected Services Address Phone Number Fax Number    [facility] [address] [facility] [address] [phone]      Community Resources      No service coordination in this encounter.        Expected Discharge Date and Time     Expected Discharge Date Expected Discharge Time    Feb 13, 2019         Demographic Summary     Row Name 02/12/19 1239       General Information    Admission Type  inpatient    Arrived From  home    Reason for Consult  discharge planning    General Information Comments  PCP:  Blayne Kirby       Contact Information    Permission Granted to Share Info With      Contact Information Comments  Daughter:  Carolyn Foley, ph [phone]        Functional Status     Row Name 02/12/19 1240       Functional Status    Usual Activity Tolerance  good    Current Activity Tolerance  -- See PT notes.       Functional Status, IADL    Medications  independent    Meal Preparation  independent    Housekeeping  independent    Laundry  independent    Shopping  independent       Mental Status    General Appearance WDL  WDL         Psychosocial    No documentation.       Abuse/Neglect    No documentation.       Legal     Row Name 02/12/19 1241       Financial/Legal    Who Manages Finances if Patient Unable  No advance directives.    Finance Comments  Ms. Fitzpatrick has prescription drug coverage with Medicare, Med D and [employer].  Verified insurance with patient.        Substance Abuse    No documentation.       Patient Forms    No documentation.           Fanta Hendrickson

## 2019-02-12 NOTE — PROGRESS NOTES
"IM progress note      Susy Fitzpatrick  [id number]  1950     LOS: 1 day     Attending: Esdras Ramires MD    Primary Care Provider: Blayne Kirby MD      Chief Complaint/Reason for visit:  Right hip pain    Subjective   Doing ok. Had episode this morning; was ambulating to BR and complained of HA, dizzy and clammy. She was found by nursing staff about 20 min later unresponsive. RRT was called, pt returned to baseline. She reports feeling better now. No c/o headache , no focal weakness complaints. No dizziness. No sob/ cp.     Objective     Vital Signs  Visit Vitals  /77 (BP Location: Right arm, Patient Position: Lying)   Pulse 75   Temp 97.8 °F (36.6 °C) (Oral)   Resp 18   Ht 157.5 cm (62\")   Wt 83.4 kg (183 lb 13.8 oz)   LMP 2001 Comment: mammogram- 2018    SpO2 96%   BMI 33.63 kg/m²     Temp (24hrs), Av °F (36.7 °C), Min:96.9 °F (36.1 °C), Max:98.6 °F (37 °C)      Nutrition: PO    Respiratory: RA    Physical Therapy: ()- Patient ambulated 250 feet with RW and step through gait pattern, limited by fatigue. Plan is d/c home with family and HHPT. Encouraged patient to ambulate again with nursing tonight. Recommend OT consult, has ordered adaptive equipment. Will continue to progress mobility training, strengthening, and ROM as able. PADD score of 9.    Physical Exam:     General Appearance:    Alert, cooperative, in no acute distress   Head:    Normocephalic, without obvious abnormality, atraumatic    Lungs:     Normal effort, symmetric chest rise, no crepitus, clear to      auscultation bilaterally             Heart:    Regular rhythm and normal rate, normal S1 and S2   Abdomen:     Normal bowel sounds, no masses, no organomegaly, soft        non-tender, non-distended, no guarding, no rebound                tenderness   Extremities:   No clubbing, cyanosis or edema.  No deformities. Right hip Prineo CDI   Pulses:   Pulses palpable and equal bilaterally   Skin:   No bleeding, bruising or " rash   Neurologic:   Moves all extremities with no obvious focal motor deficit.  Cranial nerves 2 - 12 grossly intact. Flexion and dorsiflexion intact bilateral feet.       Results Review:     I reviewed the patient's new clinical results.   Results from last 7 days   Lab Units 02/12/19  0654 02/12/19  0550   WBC 10*3/mm3 7.99 7.15   HEMOGLOBIN g/dL 9.9* 9.8*   HEMATOCRIT % 30.2* 29.7*   PLATELETS 10*3/mm3 160 172     Results from last 7 days   Lab Units 02/12/19  0550   SODIUM mmol/L 136   POTASSIUM mmol/L 4.3   CHLORIDE mmol/L 103   CO2 mmol/L 27.0   BUN mg/dL 14   CREATININE mg/dL 0.82   CALCIUM mg/dL 9.0   GLUCOSE mg/dL 140*   Results for VU MORALES (MRN [id number]) as of 2/12/2019 10:10   Ref. Range 2/12/2019 05:50   Creatine Kinase Latest Ref Range: 26 - 174 U/L 728 (H)   CKMB Latest Ref Range: 0.00 - 5.00 ng/mL 6.11 (H)   CK-MB Index Latest Ref Range: 0.0 - 3.0 % 0.8   Results for VU MORALES (MRN [id number]) as of 2/12/2019 10:10   Ref. Range 2/12/2019 06:54   Troponin I Latest Ref Range: <=0.039 ng/mL <0.006   BNP Latest Ref Range: 0.0 - 100.0 pg/mL 33.0   Results for VU MORALES (MRN [id number]) as of 2/12/2019 10:10   Ref. Range 2/12/2019 06:54   Phosphorus Latest Ref Range: 2.4 - 5.1 mg/dL 3.3   Lactate Latest Ref Range: 0.5 - 2.0 mmol/L 1.9   Magnesium Latest Ref Range: 1.3 - 2.7 mg/dL 1.7   Study Result     EXAM:    CT Head Without Contrast      EXAM DATE/TIME:    2/12/2019 6:55 AM      CLINICAL HISTORY:    68 years old, female; Other reduced mobility; Signs and symptoms; Altered   mental status/memory loss; Additional info: Confusion/delirium, altered loc,   unexplained      TECHNIQUE:    Axial computed tomography images of the head/brain without contrast.    All CT scans at this facility use at least one of these dose optimization   techniques: automated exposure control; mA and/or kV adjustment per patient   size (includes targeted exams where dose is matched to clinical  indication); or   iterative reconstruction.      COMPARISON:    No relevant prior studies available.      FINDINGS:    Brain: Normal. No hemorrhage. No significant white matter disease. No edema.    Ventricles: Normal. No ventriculomegaly.    Bones/joints: No acute abnormality identified.  No acute fracture.    Sinuses: Visualized sinuses are unremarkable. No acute sinusitis.    Mastoid air cells: Visualized mastoid air cells are unremarkable. No mastoid   effusion.    Soft tissues: Unremarkable.    Vasculature:  Atherosclerotic calcifications are present involving the carotid   artery siphons bilaterally.      IMPRESSION:  No acute intracranial abnormality identified.      ECG 12 Lead   Order: [id number]   Status:  Preliminary result   Visible to patient:  No (Not Released) Next appt:  08/21/2019 at 01:30 PM in Oncology (Nay Austin, APRN)      Narrative     Test Reason : slow response  Blood Pressure : **/** mmHG  Vent. Rate : 063 BPM     Atrial Rate : 063 BPM     P-R Int : 144 ms          QRS Dur : 088 ms      QT Int : 460 ms       P-R-T Axes : 040 000 049 degrees     QTc Int : 470 ms    Normal sinus rhythm  Normal ECG  When compared with ECG of 29-JAN-2019 15:44,  No significant change was found    Referred By:  Y           Confirmed By:              I reviewed the patient's new imaging including images and reports.    All medications reviewed.     acetaminophen 650 mg Oral Q6H   aspirin 325 mg Oral Daily   docusate sodium 100 mg Oral BID   pantoprazole 40 mg Oral QAM   sodium chloride 3 mL Intravenous Q12H       Assessment/Plan     Status post total replacement of right hip    Arthritis of right hip    GERD (gastroesophageal reflux disease)    Acute blood loss anemia    Acute postoperative pain    Orthostasis    Altered mental status episode, suspect syncope that's vasovagal or orthostatic    Plan  1. PT/OT- WBAT RLE  2. Pain control-prns   3. IS-encouraged  4. DVT proph- mechs/ASA  5. Bowel regimen  6.  Monitor post-op labs  7. DC planning for home    EKG- SR  CT head-neg  Recheck H&H at 2p; if hgb <8, transfuse 1 unit PRBC  Total of 2 liters IVF  Encouraged caffeine  Bladder scan- r/o AUR  Tele  Orthostatic BPs( positive/ volume repletion ordered)    GERD  - Continue PPI      Scribed for Dr. Yeh by JUAN Cuba. 2/12/2019  10:13 AM    JUAN Cuba  02/12/19  10:10 AM     IPamela MD, personally performed the services described in this documentation as scribed by JAUN Cuba ,and it is both accurate and complete.     Follow up on labs with low mag. Will replace.  Will place on telemetry overnight.     Discussed with patient, family, and staff.

## 2019-02-12 NOTE — PLAN OF CARE
Problem: Patient Care Overview  Goal: Plan of Care Review  Outcome: Ongoing (interventions implemented as appropriate)   02/11/19 1956 02/11/19 2000 02/12/19 0304   Plan of Care Review   Progress improving --  --    OTHER   Outcome Summary --  --  vss, UOP adequate, ambulated to bathroom multiple times, pt slept off and on this shift, 1x prn pain med given for breakthrough    Coping/Psychosocial   Plan of Care Reviewed With --  patient;sibling --      Goal: Individualization and Mutuality  Outcome: Ongoing (interventions implemented as appropriate)    Goal: Discharge Needs Assessment  Outcome: Ongoing (interventions implemented as appropriate)    Goal: Interprofessional Rounds/Family Conf  Outcome: Ongoing (interventions implemented as appropriate)      Problem: Hip Arthroplasty (Total, Partial) (Adult)  Goal: Signs and Symptoms of Listed Potential Problems Will be Absent, Minimized or Managed (Hip Arthroplasty)  Outcome: Ongoing (interventions implemented as appropriate)    Goal: Anesthesia/Sedation Recovery  Outcome: Ongoing (interventions implemented as appropriate)

## 2019-02-12 NOTE — PLAN OF CARE
Problem: Patient Care Overview  Goal: Plan of Care Review  Outcome: Ongoing (interventions implemented as appropriate)   02/11/19 1956   Plan of Care Review   Progress improving   OTHER   Outcome Summary pain well controlled with two lortabs, ambulated in amaro twice, voiding well,VSS. home in am if medically ready   Coping/Psychosocial   Plan of Care Reviewed With patient       Problem: Hip Arthroplasty (Total, Partial) (Adult)  Goal: Signs and Symptoms of Listed Potential Problems Will be Absent, Minimized or Managed (Hip Arthroplasty)  Outcome: Ongoing (interventions implemented as appropriate)   02/11/19 1956   Goal/Outcome Evaluation   Problems Assessed (Hip Arthroplasty) all   Problems Present (Hip Arthroplasty) pain

## 2019-02-12 NOTE — PLAN OF CARE
Problem: Patient Care Overview  Goal: Plan of Care Review  Outcome: Ongoing (interventions implemented as appropriate)   02/12/19 1514   Plan of Care Review   Progress improving   OTHER   Outcome Summary Patient ambulated 200 feet with RW and step through gait pattern, continues to be limited by pain. CGA for all mobility. Will continue to progress mobility training, ROM, and strengthening as able. Will initiate stair training in AM.    Coping/Psychosocial   Plan of Care Reviewed With patient;daughter       Problem: Hip Arthroplasty (Total, Partial) (Adult)  Goal: Signs and Symptoms of Listed Potential Problems Will be Absent, Minimized or Managed (Hip Arthroplasty)  Outcome: Ongoing (interventions implemented as appropriate)   02/12/19 1514   Goal/Outcome Evaluation   Problems Assessed (Hip Arthroplasty) functional deficit;joint dislocation;pain   Problems Present (Hip Arthroplasty) functional deficit;pain;joint dislocation

## 2019-02-12 NOTE — THERAPY TREATMENT NOTE
Acute Care - Physical Therapy Treatment Note  [facility]     Patient Name: Susy Fitzpatrick  : 1950  MRN: [id number]  Today's Date: 2019  Onset of Illness/Injury or Date of Surgery: 19  Date of Referral to PT: 19  Referring Physician: MD Ike    Admit Date: 2019    Visit Dx:    ICD-10-CM ICD-9-CM   1. Impaired functional mobility, balance, gait, and endurance Z74.09 V49.89   2. Status post total replacement of right hip Z96.641 V43.64   3. 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)   • Arthritis of right hip   • GERD (gastroesophageal reflux disease)   • Status post total replacement of right hip   • Acute blood loss anemia   • Acute postoperative pain       Therapy Treatment    Rehabilitation Treatment Summary     Row Name 19 1514 19 1141          Treatment Time/Intention    Discipline  physical therapist  -LR  physical therapist  -LR     Document Type  therapy note (daily note)  -LR  therapy note (daily note)  -LR     Subjective Information  complains of;pain  -LR  complains of;pain  -LR     Mode of Treatment  physical therapy;individual therapy  -LR  physical therapy;individual therapy  -LR     Patient/Family Observations  Patient supine in bed upon arrival with daughter present. IV, SCDs, ice pack to R hip.   -LR  Patient supine in bed upon arrival. IV, pulse ox, SCDs, exit alarm. Daughter and friend present.   -LR     Care Plan Review  care plan/treatment goals reviewed;risks/benefits reviewed;current/potential barriers reviewed;patient/other agree to care plan  -LR  care plan/treatment goals reviewed;risks/benefits reviewed;current/potential barriers reviewed;patient/other agree to care plan  -LR     Care Plan Review, Other Participant(s)  daughter  -LR  friend;daughter  -LR     Patient Effort  good  -LR  adequate  -LR     Existing Precautions/Restrictions   fall;right;hip, anterior  -LR  fall;right;hip, anterior  -LR     Recorded by [LR] Tiesha Fernandez, PT 02/12/19 1539 [LR] Tiesha Fernandez, PT 02/12/19 1333     Row Name 02/12/19 1514 02/12/19 1141          Cognitive Assessment/Intervention- PT/OT    Orientation Status (Cognition)  oriented x 4  -LR  oriented x 4  -LR     Follows Commands (Cognition)  WFL;follows one step commands;over 90% accuracy;verbal cues/prompting required;repetition of directions required  -LR  WFL;follows one step commands;over 90% accuracy;verbal cues/prompting required;repetition of directions required  -LR     Safety Deficit (Cognitive)  mild deficit;safety precautions awareness;safety precautions follow-through/compliance  -LR  mild deficit;safety precautions awareness;safety precautions follow-through/compliance  -LR     Recorded by [LR] Tiesha Fernandez, PT 02/12/19 1539 [LR] Tiesha Fernandez, PT 02/12/19 1333     Row Name 02/12/19 1514 02/12/19 1141          Safety Issues, Functional Mobility    Safety Issues Affecting Function (Mobility)  safety precaution awareness;safety precautions follow-through/compliance;sequencing abilities  -LR  safety precaution awareness;safety precautions follow-through/compliance;sequencing abilities  -LR     Recorded by [LR] Tiesha Fernandez, PT 02/12/19 1539 [LR] Tiesha Fernandez, PT 02/12/19 1333     Row Name 02/12/19 1514 02/12/19 1141          Mobility Assessment/Intervention    Extremity Weight-bearing Status  right lower extremity  -LR  right lower extremity  -LR     Right Lower Extremity (Weight-bearing Status)  weight-bearing as tolerated (WBAT)  -LR  weight-bearing as tolerated (WBAT)  -LR     Recorded by [LR] Tiesha Fernandez, PT 02/12/19 1539 [LR] Tiesha Fernandez, PT 02/12/19 1333     Row Name 02/12/19 1514 02/12/19 1141          Bed Mobility Assessment/Treatment    Bed Mobility Assessment/Treatment  --  supine-sit  -LR     Supine-Sit  [place] (Bed Mobility)  verbal cues;supervision  -LR  verbal cues;contact guard  -LR     Sit-Supine [place] (Bed Mobility)  verbal cues;minimum assist (75% patient effort)  -LR  --     Bed Mobility, Safety Issues  decreased use of legs for bridging/pushing  -LR  decreased use of legs for bridging/pushing  -LR     Assistive Device (Bed Mobility)  head of bed elevated;bed rails  -LR  head of bed elevated;bed rails  -LR     Comment (Bed Mobility)  Verbal cues to move LEs towards EOB and to push up from bed to raise trunk into sitting and to scoot hips out to get feet on floor. Denied dizziness upon sitting up. Verbal cues to push from bed to scoot hip back and up into bed. Cues to then lift LEs into bed, min assist to R LE to do so.   -LR  Verbal cues to move LEs towards EOB and to push up from bed to raise trunk into sitting and to scoot hips out to get feet on floor. CGA to R LE. Required increased time to perform.   -LR     Recorded by [LR] Tiesha Fernandez, PT 02/12/19 1539 [LR] Tiesha Fernandez, PT 02/12/19 1333     Row Name 02/12/19 1514 02/12/19 1141          Transfer Assessment/Treatment    Transfer Assessment/Treatment  sit-stand transfer;stand-sit transfer  -LR  sit-stand transfer;stand-sit transfer  -LR     Comment (Transfers)  Verbal cues to push up from bed to stand and to reach back for bed to lower into sitting. Verbal cues to step R LE out before t/f for comfort.   -LR  Verbal cues to push up from bed to stand and to reach back for chair to lower into sitting. Verbal cues to step R LE out before t/f for comfort.   -LR     Recorded by [LR] Tiesha Fernandez, PT 02/12/19 1539 [LR] Tiesha Fernandez, PT 02/12/19 1333     Row Name 02/12/19 1514 02/12/19 1141          Sit-Stand Transfer    Sit-Stand [place] (Transfers)  verbal cues;contact guard  -LR  verbal cues;contact guard  -LR     Assistive Device (Sit-Stand Transfers)  walker, front-wheeled  -LR  walker,  front-wheeled  -LR     Recorded by [LR] Tiesha Fernandez, PT 02/12/19 1539 [LR] Jim Tiesha Armas, PT 02/12/19 1333     Row Name 02/12/19 1514 02/12/19 1141          Stand-Sit Transfer    Stand-Sit [place] (Transfers)  verbal cues;contact guard  -LR  verbal cues;contact guard  -LR     Assistive Device (Stand-Sit Transfers)  walker, front-wheeled  -LR  walker, front-wheeled  -LR     Recorded by [LR] Tiesha Fernandez, PT 02/12/19 1539 [LR] Tiesha Fernandez, PT 02/12/19 1333     Row Name 02/12/19 1514 02/12/19 1141          Gait/Stairs Assessment/Training    98032 - Gait Training Minutes   9  -LR  12  -LR     [place] Level (Gait)  verbal cues;contact guard  -LR  verbal cues;contact guard  -LR     Assistive Device (Gait)  walker, front-wheeled  -LR  walker, front-wheeled  -LR     Distance in Feet (Gait)  200  -LR  100  -LR     Pattern (Gait)  step-through  -LR  step-to  -LR     Deviations/Abnormal Patterns (Gait)  right sided deviations;antalgic;bilateral deviations;kari decreased;gait speed decreased;stride length decreased  -LR  right sided deviations;antalgic;bilateral deviations;kari decreased;gait speed decreased;stride length decreased  -LR     Bilateral Gait Deviations  forward flexed posture;heel strike decreased  -LR  forward flexed posture;heel strike decreased  -LR     Right Sided Gait Deviations  weight shift ability decreased  -LR  weight shift ability decreased  -LR     Comment (Gait/Stairs)  Patient ambulated with step through gait pattern at slow pace. Verbal cues for increased R LE weight bearing/stance phase, decreased UE weight bearing, and increased step length. Still had to pause after stance phase on R. Gait limited by pain and fatigue.   -LR  Patient ambulated with step to gait pattern at slow pace. Verbal cues for correct sequencing of steps, increased R LE weight bearing/stance phase, decreased UE weight bearing, and increased step length. Improved with  cues for correction. Gait limited by pain.   -LR     Recorded by [LR] Tiesha Fernandez, PT 02/12/19 1539 [LR] Tiesha Fernandez, PT 02/12/19 1333     Row Name 02/12/19 1514 02/12/19 1141          Therapeutic Exercise    37318 - PT Therapeutic Exercise Minutes  7  -LR  11  -LR     Recorded by [LR] Tiesha Fernandez, PT 02/12/19 1539 [LR] Tiesha Fernandez, PT 02/12/19 1333     Row Name 02/12/19 1514 02/12/19 1141          Therapeutic Exercise    Lower Extremity (Therapeutic Exercise)  gluteal sets;heel slides, right;LAQ (long arc quad), right;quad sets, right;SAQ (short arc quad), right;SLR (straight leg raise), right  -LR  gluteal sets;heel slides, right;quad sets, right;SAQ (short arc quad), right;SLR (straight leg raise), right  -LR     Lower Extremity Range of Motion (Therapeutic Exercise)  hip abduction/adduction, right;ankle dorsiflexion/plantar flexion, right  -LR  hip abduction/adduction, right;ankle dorsiflexion/plantar flexion, right  -LR     Exercise Type (Therapeutic Exercise)  AAROM (active assistive range of motion);AROM (active range of motion);isometric contraction, static;isotonic contraction, concentric  -LR  AROM (active range of motion);AAROM (active assistive range of motion);isometric contraction, static;isotonic contraction, concentric  -LR     Position (Therapeutic Exercise)  supine  -LR  supine  -LR     Sets/Reps (Therapeutic Exercise)  x15 reps each  -LR  x15 reps each  -LR     Comment (Therapeutic Exercise)  cues for technique; min assist SLR, heel slides, hip abd  -LR  cues for technique; mod assist SLR, min assist heel slides  -LR     Recorded by [LR] Tiesha Fernandez, PT 02/12/19 1539 [LR] Tiesha Fernandez, PT 02/12/19 1333     Row Name 02/12/19 1514 02/12/19 1141          Positioning and Restraints    Pre-Treatment Position  in bed  -LR  in bed  -LR     Post Treatment Position  bed  -LR  chair  -LR     In Bed  notified nsg;supine;call light within  reach;encouraged to call for assist;exit alarm on;with family/caregiver;side rails up x2;SCD pump applied  -LR  --     In Chair  --  notified nsg;reclined;sitting;call light within reach;encouraged to call for assist;with family/caregiver;exit alarm on;compression device;legs elevated  -LR     Recorded by [LR] Tiesha Fernandez, PT 02/12/19 1539 [LR] Tiesha Fernandez, PT 02/12/19 1333     Row Name 02/12/19 1514 02/12/19 1141          Pain Assessment    Additional Documentation  Pain Scale: Numbers Pre/Post-Treatment (Group)  -LR  Pain Scale: Numbers Pre/Post-Treatment (Group)  -LR     Recorded by [LR] Tiesha Fernandez, PT 02/12/19 1539 [LR] Tiesha Fernandez, PT 02/12/19 1333     Row Name 02/12/19 1514 02/12/19 1141          Pain Scale: Numbers Pre/Post-Treatment    Pain Scale: Numbers, Pretreatment  4/10  -LR  4/10  -LR     Pain Scale: Numbers, Post-Treatment  3/10  -LR  6/10  -LR     Pain Location - Side  Right  -LR  Right  -LR     Pain Location  hip  -LR  hip  -LR     Pain Intervention(s)  Repositioned;Ambulation/increased activity;Cold pack;Medication (See MAR) pre-medicated.   -LR  Repositioned;Ambulation/increased activity  -LR     Recorded by [LR] Tiesha Fernandez, PT 02/12/19 1539 [LR] Tiesha Fernandez, PT 02/12/19 1333     Row Name                Wound 02/11/19 1028 Right hip incision    Wound - Properties Group Date first assessed: 02/11/19 [RV] Time first assessed: 1028 [RV] Side: Right [RV] Location: hip [RV] Type: incision [RV] Recorded by:  [RV] Abram Zheng RN 02/11/19 1028    Row Name 02/12/19 1514 02/12/19 1141          Coping    Observed Emotional State  accepting;cooperative  -LR  accepting;cooperative  -LR     Verbalized Emotional State  acceptance  -LR  acceptance  -LR     Recorded by [LR] Tiesha Fernandez, PT 02/12/19 1539 [LR] Tiesha Fernandez, PT 02/12/19 1333     Row Name 02/12/19 1514 02/12/19 1141          Plan of Care Review    Plan  of Care Reviewed With  patient;daughter  -LR  patient;daughter;friend  -LR     Recorded by [LR] Tiesha Fernandez, PT 02/12/19 1539 [LR] Tiesha Fernandez, PT 02/12/19 1333     Row Name 02/12/19 1514 02/12/19 1141          Outcome Summary/Treatment Plan (PT)    Daily Summary of Progress (PT)  progress toward functional goals is good  -LR  progress toward functional goals is gradual  -LR     Recorded by [LR] Tiesha Fernandez, PT 02/12/19 1539 [LR] Tiesha Fernandez, PT 02/12/19 1333       User Key  (r) = Recorded By, (t) = Taken By, (c) = Cosigned By    Initials Name Effective Dates Discipline    LR Tiesha Fernandez, PT 06/19/15 -  PT    Abram Roe RN 06/16/16 -  Nurse          Wound 02/11/19 1028 Right hip incision (Active)   Dressing Appearance dry;intact;no drainage 2/12/2019  8:00 AM   Closure Liquid skin adhesive 2/12/2019  8:00 AM   Periwound intact 2/11/2019  6:26 PM   Drainage Amount none 2/12/2019  8:00 AM       Rehab Goal Summary     Row Name 02/12/19 1413             Occupational Therapy Goals    Bed Mobility Goal Selection (OT)  bed mobility, OT goal 1  -MILLIE      Transfer Goal Selection (OT)  transfer, OT goal 1  -MILLIE      Dressing Goal Selection (OT)  dressing, OT goal 1  -MILLIE      Toileting Goal Selection (OT)  toileting, OT goal 1  -MILLIE         Bed Mobility Goal 1 (OT)    Activity/Assistive Device (Bed Mobility Goal 1, OT)  bed mobility activities, all;leg   -MILLIE      [place] Level/Cues Needed (Bed Mobility Goal 1, OT)  conditional independence  -MILLIE      Time Frame (Bed Mobility Goal 1, OT)  long term goal (LTG);3 days  -MILLIE      Progress/Outcomes (Bed Mobility Goal 1, OT)  goal ongoing;progress slower than expected  -MILLIE         Transfer Goal 1 (OT)    Activity/Assistive Device (Transfer Goal 1, OT)  sit-to-stand/stand-to-sit;toilet;commode, bedside without drop arms;walker, rolling  -MILLIE      [place] Level/Cues Needed (Transfer Goal 1, OT)  supervision  required  -MILLIE      Time Frame (Transfer Goal 1, OT)  long term goal (LTG);3 days  -MILLIE      Progress/Outcome (Transfer Goal 1, OT)  goal ongoing  -MILLIE         Dressing Goal 1 (OT)    Activity/Assistive Device (Dressing Goal 1, OT)  lower body dressing;long handled shoe horn;reacher;sock-aid  -MILLIE      [place]/Cues Needed (Dressing Goal 1, OT)  supervision required;tactile cues required;verbal cues required  -MILLIE      Time Frame (Dressing Goal 1, OT)  long term goal (LTG);3 days  -MILLIE      Progress/Outcome (Dressing Goal 1, OT)  continuing progress toward goal  -MILLIE         Patient Education Goal (OT)    Activity (Patient Education Goal, OT)  AE use LBD/LBD/bed mobility, AD use bathroom transfers, car transfers.  -MILLIE      [place]/Cues/Accuracy (Memory Goal 2, OT)  verbalizes understanding;demonstrates adequately  -MILLIE      Time Frame (Patient Education Goal, OT)  long term goal (LTG);3 days  -MILLIE      Progress/Outcome (Patient Education Goal, OT)  goal ongoing  -MILLIE        User Key  (r) = Recorded By, (t) = Taken By, (c) = Cosigned By    Initials Name Provider Type Discipline    Felicitas Pedro, OT Occupational Therapist OT          Physical Therapy Education     Title: PT OT SLP Therapies (In Progress)     Topic: Physical Therapy (Done)     Point: Mobility training (Done)     Learning Progress Summary           Patient Acceptance, E,D, VU,NR by LR at 2/12/2019  3:14 PM    Comment:  Educated on HEP, correct supine <-> sit t/f technique, correct sit<->stand t/f technique, correct gait mechanics, and progression of POC.    Acceptance, E,D, VU,NR by LR at 2/12/2019 11:41 AM    Comment:  Educated on hip precautions, weight bearing status, correct supine to sit t/f technique, correct sit<->stand t/f technique, correct gait mechanics, benefits of mobility, and progression of POC.    Acceptance, E,D, VU,NR by LR at 2/11/2019  2:46 PM    Comment:  Educated on hip precautions, weight bearing status, correct supine to sit  t/f technique, correct sit<->stand t/f technique, correct gait mechanics, and progression of POC.   Family Acceptance, E,D, VU,NR by LR at 2/12/2019  3:14 PM    Comment:  Educated on HEP, correct supine <-> sit t/f technique, correct sit<->stand t/f technique, correct gait mechanics, and progression of POC.    Acceptance, E,D, VU,NR by LR at 2/12/2019 11:41 AM    Comment:  Educated on hip precautions, weight bearing status, correct supine to sit t/f technique, correct sit<->stand t/f technique, correct gait mechanics, benefits of mobility, and progression of POC.    Acceptance, E,D, VU,NR by LR at 2/11/2019  2:46 PM    Comment:  Educated on hip precautions, weight bearing status, correct supine to sit t/f technique, correct sit<->stand t/f technique, correct gait mechanics, and progression of POC.                   Point: Home exercise program (Done)     Learning Progress Summary           Patient Acceptance, E,D, VU,NR by LR at 2/12/2019  3:14 PM    Comment:  Educated on HEP, correct supine <-> sit t/f technique, correct sit<->stand t/f technique, correct gait mechanics, and progression of POC.    Acceptance, E,D, VU,NR by LR at 2/12/2019 11:41 AM    Comment:  Educated on hip precautions, weight bearing status, correct supine to sit t/f technique, correct sit<->stand t/f technique, correct gait mechanics, benefits of mobility, and progression of POC.    Acceptance, E,D, VU,NR by LR at 2/11/2019  2:46 PM    Comment:  Educated on hip precautions, weight bearing status, correct supine to sit t/f technique, correct sit<->stand t/f technique, correct gait mechanics, and progression of POC.   Family Acceptance, E,D, VU,NR by LR at 2/12/2019  3:14 PM    Comment:  Educated on HEP, correct supine <-> sit t/f technique, correct sit<->stand t/f technique, correct gait mechanics, and progression of POC.    Acceptance, E,D, VU,NR by LR at 2/12/2019 11:41 AM    Comment:  Educated on hip precautions, weight bearing status,  correct supine to sit t/f technique, correct sit<->stand t/f technique, correct gait mechanics, benefits of mobility, and progression of POC.    Acceptance, E,D, VU,NR by LR at 2/11/2019  2:46 PM    Comment:  Educated on hip precautions, weight bearing status, correct supine to sit t/f technique, correct sit<->stand t/f technique, correct gait mechanics, and progression of POC.                   Point: Body mechanics (Done)     Learning Progress Summary           Patient Acceptance, E,D, VU,NR by LR at 2/12/2019  3:14 PM    Comment:  Educated on HEP, correct supine <-> sit t/f technique, correct sit<->stand t/f technique, correct gait mechanics, and progression of POC.    Acceptance, E,D, VU,NR by LR at 2/12/2019 11:41 AM    Comment:  Educated on hip precautions, weight bearing status, correct supine to sit t/f technique, correct sit<->stand t/f technique, correct gait mechanics, benefits of mobility, and progression of POC.    Acceptance, E,D, VU,NR by LR at 2/11/2019  2:46 PM    Comment:  Educated on hip precautions, weight bearing status, correct supine to sit t/f technique, correct sit<->stand t/f technique, correct gait mechanics, and progression of POC.   Family Acceptance, E,D, VU,NR by LR at 2/12/2019  3:14 PM    Comment:  Educated on HEP, correct supine <-> sit t/f technique, correct sit<->stand t/f technique, correct gait mechanics, and progression of POC.    Acceptance, E,D, VU,NR by LR at 2/12/2019 11:41 AM    Comment:  Educated on hip precautions, weight bearing status, correct supine to sit t/f technique, correct sit<->stand t/f technique, correct gait mechanics, benefits of mobility, and progression of POC.    Acceptance, E,D, VU,NR by LR at 2/11/2019  2:46 PM    Comment:  Educated on hip precautions, weight bearing status, correct supine to sit t/f technique, correct sit<->stand t/f technique, correct gait mechanics, and progression of POC.                   Point: Precautions (Done)     Learning  Progress Summary           Patient Acceptance, E,D, VU,NR by LR at 2/12/2019  3:14 PM    Comment:  Educated on HEP, correct supine <-> sit t/f technique, correct sit<->stand t/f technique, correct gait mechanics, and progression of POC.    Acceptance, E,D, VU,NR by LR at 2/12/2019 11:41 AM    Comment:  Educated on hip precautions, weight bearing status, correct supine to sit t/f technique, correct sit<->stand t/f technique, correct gait mechanics, benefits of mobility, and progression of POC.    Acceptance, E,D, VU,NR by LR at 2/11/2019  2:46 PM    Comment:  Educated on hip precautions, weight bearing status, correct supine to sit t/f technique, correct sit<->stand t/f technique, correct gait mechanics, and progression of POC.   Family Acceptance, E,D, VU,NR by LR at 2/12/2019  3:14 PM    Comment:  Educated on HEP, correct supine <-> sit t/f technique, correct sit<->stand t/f technique, correct gait mechanics, and progression of POC.    Acceptance, E,D, VU,NR by LR at 2/12/2019 11:41 AM    Comment:  Educated on hip precautions, weight bearing status, correct supine to sit t/f technique, correct sit<->stand t/f technique, correct gait mechanics, benefits of mobility, and progression of POC.    Acceptance, E,D, VU,NR by LR at 2/11/2019  2:46 PM    Comment:  Educated on hip precautions, weight bearing status, correct supine to sit t/f technique, correct sit<->stand t/f technique, correct gait mechanics, and progression of POC.                               User Key     Initials Effective Dates Name Provider Type Discipline    LR 06/19/15 -  Tiesha Fernandez, PT Physical Therapist PT                PT Recommendation and Plan  Anticipated Discharge Disposition (PT): home with assist, home with home health  Planned Therapy Interventions (PT Eval): balance training, bed mobility training, gait training, home exercise program, patient/family education, ROM (range of motion), stair training, strengthening, transfer  training  Therapy Frequency (PT Clinical Impression): 2 times/day  Outcome Summary/Treatment Plan (PT)  Daily Summary of Progress (PT): progress toward functional goals is good  Anticipated Equipment Needs at Discharge (PT): front wheeled walker, bedside commode  Anticipated Discharge Disposition (PT): home with assist, home with home health  Plan of Care Reviewed With: patient, daughter  Progress: improving  Outcome Summary: Patient ambulated 200 feet with RW and step through gait pattern, continues to be limited by pain. CGA for all mobility. Will continue to progress mobility training, ROM, and strengthening as able. Will initiate stair training in AM.   Outcome Measures     Row Name 02/12/19 1514 02/12/19 1413 02/12/19 1141       How much help from another person do you currently need...    Turning from your back to your side while in flat bed without using bedrails?  3  -LR  --  3  -LR    Moving from lying on back to sitting on the side of a flat bed without bedrails?  3  -LR  --  3  -LR    Moving to and from a bed to a chair (including a wheelchair)?  3  -LR  --  3  -LR    Standing up from a chair using your arms (e.g., wheelchair, bedside chair)?  3  -LR  --  3  -LR    Climbing 3-5 steps with a railing?  3  -LR  --  3  -LR    To walk in hospital room?  3  -LR  --  3  -LR    AM-PAC 6 Clicks Score  18  -LR  --  18  -LR       How much help from another is currently needed...    Putting on and taking off regular lower body clothing?  --  3 with AE use  -MILLIE  --    Bathing (including washing, rinsing, and drying)  --  3 AE/AD use  -MILLIE  --    Toileting (which includes using toilet bed pan or urinal)  --  4  -MILLIE  --    Putting on and taking off regular upper body clothing  --  4  -MILLIE  --    Taking care of personal grooming (such as brushing teeth)  --  3  -MILLIE  --    Eating meals  --  4  -MILLIE  --    Score  --  21  -MILLIE  --       Functional Assessment    Outcome Measure Options  AM-PAC 6 Clicks Basic Mobility (PT)  -LR   AM-[facility] 6 Clicks Daily Activity (OT)  -MILLIE  [facility] 6 Clicks Basic Mobility (PT)  -LR    Row Name 02/11/19 1446             How much help from another person do you currently need...    Turning from your back to your side while in flat bed without using bedrails?  3  -LR      Moving from lying on back to sitting on the side of a flat bed without bedrails?  3  -LR      Moving to and from a bed to a chair (including a wheelchair)?  3  -LR      Standing up from a chair using your arms (e.g., wheelchair, bedside chair)?  3  -LR      Climbing 3-5 steps with a railing?  3  -LR      To walk in hospital room?  3  -LR      AM-PAC 6 Clicks Score  18  -LR         Functional Assessment    Outcome Measure Options  AM-[facility] 6 Clicks Basic Mobility (PT)  -LR        User Key  (r) = Recorded By, (t) = Taken By, (c) = Cosigned By    Initials Name Provider Type    MILLIE Felicitas Prado, OT Occupational Therapist    LR Tiesha Fernandez, PT Physical Therapist         Time Calculation:   PT Charges     Row Name 02/12/19 1514 02/12/19 1141          Time Calculation    Start Time  1514  -LR  1141  -LR     PT Received On  02/12/19  -LR  02/12/19  -LR     PT Goal Re-Cert Due Date  02/21/19  -LR  02/21/19  -LR        Time Calculation- PT    Total Timed Code Minutes- PT  16 minute(s)  -LR  23 minute(s)  -LR        Timed Charges    39229 - PT Therapeutic Exercise Minutes  7  -LR  11  -LR     84153 - Gait Training Minutes   9  -LR  12  -LR       User Key  (r) = Recorded By, (t) = Taken By, (c) = Cosigned By    Initials Name Provider Type    LR Tiesha Fernandez, PT Physical Therapist        Therapy Suggested Charges     Code   Minutes Charges    57933 (CPT®) Hc Pt Neuromusc Re Education Ea 15 Min      84211 (CPT®) Hc Pt Ther Proc Ea 15 Min 7     86527 (CPT®) Hc Gait Training Ea 15 Min 9 1    33922 (CPT®) Hc Pt Therapeutic Act Ea 15 Min      12610 (CPT®) Hc Pt Manual Therapy Ea 15 Min      50417 (CPT®) Hc Pt Iontophoresis Ea 15 Min      54685  (CPT®) Hc Pt Elec Stim Ea-Per 15 Min      76201 (CPT®) Hc Pt Ultrasound Ea 15 Min      82180 (CPT®) Hc Pt Self Care/Mgmt/Train Ea 15 Min      91699 (CPT®) Hc Pt Prosthetic (S) Train Initial Encounter, Each 15 Min      16576 (CPT®) Hc Pt Orthotic(S)/Prosthetic(S) Encounter, Each 15 Min      53424 (CPT®) Hc Orthotic(S) Mgmt/Train Initial Encounter, Each 15min      Total  16 1        Therapy Charges for Today     Code Description Service Date Service Provider Modifiers Qty    13719315258 HC GAIT TRAINING EA 15 MIN 2/11/2019 Tiesha Fernandez, PT GP 1    03034083761 HC PT THER SUPP EA 15 MIN 2/11/2019 Tiesha Fernandez, PT GP 2    23505072147 HC PT EVAL LOW COMPLEXITY 3 2/11/2019 Tiesha Fernandez, PT GP 1    87819381078 HC PT THER PROC EA 15 MIN 2/12/2019 Tiesha Fernandez, PT GP 1    33055567257 HC GAIT TRAINING EA 15 MIN 2/12/2019 Tiesha Fernandez, PT GP 1    99981679455 HC GAIT TRAINING EA 15 MIN 2/12/2019 Tiesha Fernandez, PT GP 1          PT G-Codes  Outcome Measure Options: AM-PAC 6 Clicks Basic Mobility (PT)  AM-PAC 6 Clicks Score: 18  Score: 21    Tiesha Fernandez, PT  2/12/2019

## 2019-02-12 NOTE — THERAPY EVALUATION
Acute Care - Occupational Therapy Initial Evaluation  [facility]     Patient Name: Susy Fitzpatrick  : 1950  MRN: [id number]  Today's Date: 2019  Onset of Illness/Injury or Date of Surgery: 19  Date of Referral to OT: 19  Referring Physician: MD Ike    Admit Date: 2019       ICD-10-CM ICD-9-CM   1. Impaired functional mobility, balance, gait, and endurance Z74.09 V49.89   2. Status post total replacement of right hip Z96.641 V43.64   3. 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)   • Arthritis of right hip   • GERD (gastroesophageal reflux disease)   • Status post total replacement of right hip   • Acute blood loss anemia   • Acute postoperative pain     Past Medical History:   Diagnosis Date   • Anemia    • Arthritis     right hip    • Cancer (CMS/HCC)    • Diffuse large B cell lymphoma (CMS/HCC)    • Drug therapy     20 TREATMENTS [id number]   • GERD (gastroesophageal reflux disease)      Past Surgical History:   Procedure Laterality Date   • CHOLECYSTECTOMY     • COLONOSCOPY  2018   • ENDOSCOPY     • LYMPH NODE BIOPSY     • TONSILLECTOMY     • TOTAL HIP ARTHROPLASTY Right 2019    Procedure: TOTAL RIGHT HIP ARTHROPLASTY ANTERIOR;  Surgeon: Esdras Ramires MD;  Location: [facility];  Service: Orthopedics          OT ASSESSMENT FLOWSHEET (last 72 hours)      Occupational Therapy Evaluation     Row Name 19 1413                   OT Evaluation Time/Intention    Subjective Information  complains of;pain;fatigue  -MILLIE        Document Type  evaluation;therapy note (daily note)  -MILLIE        Mode of Treatment  individual therapy;occupational therapy  -MILLIE        Patient Effort  good  -MILLIE        Symptoms Noted During/After Treatment  increased pain with certain mvmts especially bed mobility  -MILLIE           General Information    Patient Profile Reviewed?  yes  -MILLIE         Onset of Illness/Injury or Date of Surgery  02/11/19  -MILLIE        Referring Physician  MD Ike  -MILLIE        Patient Observations  alert;cooperative;agree to therapy  -MILLIE        Patient/Family Observations  Patient supine in bed upon arrival with dtr. present.  Pt. with JOB KENYON, SCD;s exit alarm.  -MILLIE        Prior Level of Function  min assist:;all household mobility;community mobility;gait;transfer;ADL's;home management;mod assist:;driving;shopping [employer] to shop, diff. dressing self, drive/mob short dist.  -MILLIE        Equipment Currently Used at Home  cane, quad;commode, bedside;walker, rolling;crutches, axillary BSC, RX wx ordered at [facility], dtr. to order AE  -MILLIE        Pertinent History of Current Functional Problem  Patient presents for surgical management of persistant and progressive R hip pain and dysfunction that has failed to improve with conservative measures.  Pt. now s/p R THR anterior for R hip OA.  -MILLIE        Existing Precautions/Restrictions  fall;hip, anterior;right  -MILLIE        Limitations/Impairments  -- none  -MILLIE        Equipment Issued to Patient  leg   -MILLIE        Equipment Ordered for Patient  -- dtr ordering AE, BSC and wx already delivered  -MILLIE        Risks Reviewed  patient and family:;LOB;increased discomfort;change in vital signs;lines disloged  -MILLIE        Benefits Reviewed  patient and family:;improve function;increase independence;increase knowledge  -MILLIE        Barriers to Rehab  medically complex  -MILLIE           Relationship/Environment    Primary Source of Support/Comfort  child(nhi)  -MILLIE        Lives With  alone  -MILLIE        Family Caregiver if Needed  grandchild(nhi), adult;sibling(s)  -MILLIE           Resource/Environmental Concerns    Current Living Arrangements  home/apartment/condo  -MILLIE           Home Main Entrance    Number of Stairs, Main Entrance  one  -MILLIE        Stair Railings, Main Entrance  none  -MILLIE           Cognitive Assessment/Interventions    Additional Documentation   Cognitive Assessment/Intervention (Group)  -           Cognitive Assessment/Intervention- PT/OT    Orientation Status (Cognition)  oriented to;person  -MILLIE        Follows Commands (Cognition)  WFL;follows one step commands  -        Safety Deficit (Cognitive)  mild deficit;safety precautions awareness;safety precautions follow-through/compliance  -        Personal Safety Interventions  fall prevention program maintained;gait belt;nonskid shoes/slippers when out of bed  -           Safety Issues, Functional Mobility    Safety Issues Affecting Function (Mobility)  safety precaution awareness;safety precautions follow-through/compliance  -        Impairments Affecting Function (Mobility)  pain;strength  -           Mobility Assessment/Treatment    Extremity Weight-bearing Status  right lower extremity  -MILLIE        Right Lower Extremity (Weight-bearing Status)  weight-bearing as tolerated (WBAT)  -           Bed Mobility Assessment/Treatment    Bed Mobility Assessment/Treatment  supine-sit;sit-supine;scooting/bridging  -        Scooting/Bridging [place] (Bed Mobility)  supervision;verbal cues to EOB and up to HOB in long sitting  -        Supine-Sit [place] (Bed Mobility)  verbal cues;minimum assist (75% patient effort)  -        Sit-Supine [place] (Bed Mobility)  contact guard;verbal cues  -        Bed Mobility, Safety Issues  decreased use of arms for pushing/pulling;decreased use of legs for bridging/pushing;impaired trunk control for bed mobility  -        Assistive Device (Bed Mobility)  leg  no rails to more closely simulate home func  -MILLIE        Comment (Bed Mobility)  Pt. limited by pain to bring RLE to EOB,  Needs further use of leg  and bed fully place flat as at home.  -           Functional Mobility    Functional Mobility- Ind. Level  contact guard assist  -        Functional Mobility- Device  rolling walker  -        Functional Mobility-Distance (Feet)   20  -MILLIE        Functional Mobility- Safety Issues  step length decreased  -MILLIE        Functional Mobility- Comment  slow pace  -MILLIE           Transfer Assessment/Treatment    Transfer Assessment/Treatment  sit-stand transfer;stand-sit transfer;toilet transfer  -        Comment (Transfers)  verbal cues for hand and RLE placement for safety adn comfort.  -MILLIE           Sit-Stand Transfer    Sit-Stand [place] (Transfers)  verbal cues;contact guard  -MILLIE        Assistive Device (Sit-Stand Transfers)  walker, front-wheeled  -MILLIE           Stand-Sit Transfer    Stand-Sit [place] (Transfers)  verbal cues;contact guard  -MILLIE        Assistive Device (Stand-Sit Transfers)  walker, front-wheeled  -MILLIE           Toilet Transfer    Type (Toilet Transfer)  sit-stand;stand-sit  -MILLIE        [place] Level (Toilet Transfer)  verbal cues;contact guard  -MILLIE        Assistive Device (Toilet Transfer)  walker, front-wheeled;raised toilet seat;grab bars/safety frame BSC to be placed over home toilet, also sink to side of it.  -MILLIE           ADL Assessment/Intervention    07463 - OT Self Care/Mgmt Minutes  20  -MILLIE        BADL Assessment/Intervention  lower body dressing;bathing;grooming;toileting  -           Bathing Assessment/Intervention    Comment (Bathing)  OT used LH bath sponge on RLE to simulate how to use at home to wash below knee.  -           Lower Body Dressing Assessment/Training    Lower Body Dressing [place] Level  doff;don;pants/bottoms;shoes/slippers;socks;undergarment;verbal cues;supervision  -        Assistive Devices (Lower Body Dressing)  long-handled shoe horn;reacher;sock-aid  -        Lower Body Dressing Position  edge of bed sitting  -        Comment (Lower Body Dressing)  Post education pt. donned pants and undergarment up to knees and off with cues, pt. donned and doffed L shoe with AE.  Pt. declined pants all way on and R shoe due to pain.  Pt. was also able to liban and doff B socks with  AE.  Pt. needed intermittent cueing only.  -MILLIE           Grooming Assessment/Training    [place] Level (Grooming)  wash face, hands  -MILLIE        Grooming Position  supported standing  -MILLIE        Comment (Grooming)  sinkside washed hands.  -MILLIE           Toileting Assessment/Training    [place] Level (Toileting)  supervision;perform perineal hygiene  -MILLIE        Assistive Devices (Toileting)  raised toilet seat;grab bar/safety frame  -MILLIE        Toileting Position  supported sitting  -MILLIE           BADL Safety/Performance    Impairments, BADL Safety/Performance  strength;pain  -MILLIE        Skilled BADL Treatment/Intervention  adaptive equipment training;BADL process/adaptation training;compensatory training  -MILLIE        Progress in BADL Status  independence level;use of adaptive equipment;use of compensatory strategies  -MILLIE           General ROM    GENERAL ROM COMMENTS  WFL AROM BUE for ADL and transfer completion.  -MILLIE           MMT (Manual Muscle Testing)    General MMT Comments  WFL for ADL and transfer completion  -MILLIE           Motor Assessment/Interventions    Additional Documentation  Therapeutic Exercise (Group);Therapeutic Exercise Interventions (Group)  -MILLIE           Therapeutic Exercise    86589 - OT Therapeutic Activity Minutes  9  -MILLIE           Sensory Assessment/Intervention    Sensory General Assessment  no sensation deficits identified pt. did not report numbness or tingling BUE  -MILLIE           Positioning and Restraints    Pre-Treatment Position  in bed  -MILLIE        Post Treatment Position  bed  -MILLIE        In Bed  supine;call light within reach;encouraged to call for assist;exit alarm on;with family/caregiver;SCD pump applied;RLE elevated Ice pack switched out and to R hip  -MILLIE           Pain Scale: Numbers Pre/Post-Treatment    Pain Scale: Numbers, Pretreatment  5/10  -MILLIE        Pain Scale: Numbers, Post-Treatment  6/10  -MILLIE        Pain Location - Side  Right  -MILLIE        Pain Location  hip  -MILLIE         Pain Intervention(s)  Ambulation/increased activity;Repositioned;Cold applied alerted nursing.  -MILLIE           Wound 02/11/19 1028 Right hip incision    Wound - Properties Group Date first assessed: 02/11/19  -RV Time first assessed: 1028  -RV Side: Right  -RV Location: hip  -RV Type: incision  -RV       Plan of Care Review    Plan of Care Reviewed With  patient;family  -MILLIE           Clinical Impression (OT)    Date of Referral to OT  02/11/19  -MILLIE        OT Diagnosis  impaired ADL and transfer independence due to R THR.  -MILLIE        Patient/Family Goals Statement (OT Eval)  Pt. wants to regain PLOF with less pain and dysfunction  -MILLIE        Criteria for Skilled Therapeutic Interventions Met (OT Eval)  yes;treatment indicated  -MILLIE        Rehab Potential (OT Eval)  good, to achieve stated therapy goals  -MILLIE        Therapy Frequency (OT Eval)  daily  -MILLIE        Care Plan Review (OT)  evaluation/treatment results reviewed;care plan/treatment goals reviewed;risks/benefits reviewed;current/potential barriers reviewed;patient/other agree to care plan  -MILLIE        Care Plan Review, Other Participant (OT Eval)  family  -MILLIE        Anticipated Equipment Needs at Discharge (OT)  -- LBD dressing AE/tub bench(dtr plans to order both)  -MILLIE        Anticipated Discharge Disposition (OT)  home with assist;home with home health  -MILLIE           Planned OT Interventions    Planned Therapy Interventions (OT Eval)  adaptive equipment training;BADL retraining;occupation/activity based interventions;strengthening exercise  -MILLIE           OT Goals    Bed Mobility Goal Selection (OT)  bed mobility, OT goal 1  -MILLIE        Transfer Goal Selection (OT)  transfer, OT goal 1  -MILLIE        Dressing Goal Selection (OT)  dressing, OT goal 1  -MILLIE        Toileting Goal Selection (OT)  toileting, OT goal 1  -MILLIE           Bed Mobility Goal 1 (OT)    Activity/Assistive Device (Bed Mobility Goal 1, OT)  bed mobility activities, all;leg   -MILLIE         [place] Level/Cues Needed (Bed Mobility Goal 1, OT)  conditional independence  -MILLIE        Time Frame (Bed Mobility Goal 1, OT)  long term goal (LTG);3 days  -MILLIE        Progress/Outcomes (Bed Mobility Goal 1, OT)  goal ongoing;progress slower than expected  -MILLIE           Transfer Goal 1 (OT)    Activity/Assistive Device (Transfer Goal 1, OT)  sit-to-stand/stand-to-sit;toilet;commode, bedside without drop arms;walker, rolling  -MILLIE        [place] Level/Cues Needed (Transfer Goal 1, OT)  supervision required  -MILLIE        Time Frame (Transfer Goal 1, OT)  long term goal (LTG);3 days  -MILLIE        Progress/Outcome (Transfer Goal 1, OT)  goal ongoing  -MILLIE           Dressing Goal 1 (OT)    Activity/Assistive Device (Dressing Goal 1, OT)  lower body dressing;long handled shoe horn;reacher;sock-aid  -MILLIE        [place]/Cues Needed (Dressing Goal 1, OT)  supervision required;tactile cues required;verbal cues required  -MILLIE        Time Frame (Dressing Goal 1, OT)  long term goal (LTG);3 days  -MILLIE        Progress/Outcome (Dressing Goal 1, OT)  continuing progress toward goal  -MILLIE           Patient Education Goal (OT)    Activity (Patient Education Goal, OT)  AE use LBD/LBD/bed mobility, AD use bathroom transfers, car transfers.  -MILLIE        [place]/Cues/Accuracy (Memory Goal 2, OT)  verbalizes understanding;demonstrates adequately  -MILLIE        Time Frame (Patient Education Goal, OT)  long term goal (LTG);3 days  -MILLIE        Progress/Outcome (Patient Education Goal, OT)  goal ongoing  -MILLIE           Living Environment    Home Accessibility  not wheelchair accessible;tub/shower is not walk in  -MILLIE          User Key  (r) = Recorded By, (t) = Taken By, (c) = Cosigned By    Initials Name Effective Dates    Felicitas Pedro, OT 06/08/18 -     Abram Roe RN 06/16/16 -          Occupational Therapy Education     Title: PT OT SLP Therapies (In Progress)     Topic: Occupational Therapy (In Progress)     Point:  ADL training (In Progress)     Description: Instruct learner(s) on proper safety adaptation and remediation techniques during self care or transfers.   Instruct in proper use of assistive devices.    Learning Progress Summary           Patient Acceptance, E,D, NR by MILLIE at 2/12/2019  2:13 PM    Comment:  AE use with bathing/dressing/bed mobility.  AD for bathroom safety, transfer safety.   Family Acceptance, E,D, NR by MILLIE at 2/12/2019  2:13 PM    Comment:  AE use with bathing/dressing/bed mobility.  AD for bathroom safety, transfer safety.                   Point: Precautions (In Progress)     Description: Instruct learner(s) on prescribed precautions during self-care and functional transfers.    Learning Progress Summary           Patient Acceptance, E,D, NR by MILLIE at 2/12/2019  2:13 PM    Comment:  AE use with bathing/dressing/bed mobility.  AD for bathroom safety, transfer safety.   Family Acceptance, E,D, NR by MILLIE at 2/12/2019  2:13 PM    Comment:  AE use with bathing/dressing/bed mobility.  AD for bathroom safety, transfer safety.                   Point: Body mechanics (In Progress)     Description: Instruct learner(s) on proper positioning and spine alignment during self-care, functional mobility activities and/or exercises.    Learning Progress Summary           Patient Acceptance, E,D, NR by MILLIE at 2/12/2019  2:13 PM    Comment:  AE use with bathing/dressing/bed mobility.  AD for bathroom safety, transfer safety.   Family Acceptance, E,D, NR by MILLIE at 2/12/2019  2:13 PM    Comment:  AE use with bathing/dressing/bed mobility.  AD for bathroom safety, transfer safety.                               User Key     Initials Effective Dates Name Provider Type Discipline    MILLIE 06/08/18 -  Felicitas Prado, OT Occupational Therapist OT                  OT Recommendation and Plan  Outcome Summary/Treatment Plan (OT)  Anticipated Equipment Needs at Discharge (OT): (LBD dressing AE/tub bench(dtr plans to order  both))  Anticipated Discharge Disposition (OT): home with assist, home with home health  Planned Therapy Interventions (OT Eval): adaptive equipment training, BADL retraining, occupation/activity based interventions, strengthening exercise  Therapy Frequency (OT Eval): daily  Plan of Care Review  Plan of Care Reviewed With: patient, family  Plan of Care Reviewed With: patient, family  Outcome Summary: OT evaluation completed.  Pt. educated on AE use with bed mobility, dressing and bathing.  Pt. CGA to min A for ADL's, transfers and bed moblity.  Pt. limited by pain.  Pt. will benefit from continued skilled OT services to continue education and promote return to independence.      Outcome Measures     Row Name 02/12/19 1413 02/12/19 1141 02/11/19 1446       How much help from another person do you currently need...    Turning from your back to your side while in flat bed without using bedrails?  --  3  -LR  3  -LR    Moving from lying on back to sitting on the side of a flat bed without bedrails?  --  3  -LR  3  -LR    Moving to and from a bed to a chair (including a wheelchair)?  --  3  -LR  3  -LR    Standing up from a chair using your arms (e.g., wheelchair, bedside chair)?  --  3  -LR  3  -LR    Climbing 3-5 steps with a railing?  --  3  -LR  3  -LR    To walk in hospital room?  --  3  -LR  3  -LR    AM-PAC 6 Clicks Score  --  18  -LR  18  -LR       How much help from another is currently needed...    Putting on and taking off regular lower body clothing?  3 with AE use  -MILLIE  --  --    Bathing (including washing, rinsing, and drying)  3 AE/AD use  -MILLIE  --  --    Toileting (which includes using toilet bed pan or urinal)  4  -MILLIE  --  --    Putting on and taking off regular upper body clothing  4  -MILLIE  --  --    Taking care of personal grooming (such as brushing teeth)  3  -MILLIE  --  --    Eating meals  4  -MILLIE  --  --    Score  21  -MILLIE  --  --       Functional Assessment    Outcome Measure Options  AM-PAC 6 Clicks  Daily Activity (OT)  -MILLIE  AM-PAC 6 Clicks Basic Mobility (PT)  -LR  AM-PAC 6 Clicks Basic Mobility (PT)  -LR      User Key  (r) = Recorded By, (t) = Taken By, (c) = Cosigned By    Initials Name Provider Type    Felicitas Pedro, OT Occupational Therapist    LR Tiesha Fernandez, PT Physical Therapist          Time Calculation:   Time Calculation- OT     Row Name 02/12/19 1413 02/12/19 1141          Time Calculation- OT    OT Start Time  1413  -MILLIE  --     OT Received On  02/12/19  -MILLIE  --     OT Goal Re-Cert Due Date  02/22/19  -MILLIE  --        Timed Charges    57036 - Gait Training Minutes   --  12  -LR     83967 - OT Therapeutic Activity Minutes  9  -MILLIE  --     56978 - OT Self Care/Mgmt Minutes  20  -MILLIE  --       User Key  (r) = Recorded By, (t) = Taken By, (c) = Cosigned By    Initials Name Provider Type    Felicitas Pedro, OT Occupational Therapist    LR Tiesha Fernandez, PT Physical Therapist        Therapy Suggested Charges     Code   Minutes Charges    23302 (CPT®) Hc Ot Neuromusc Re Education Ea 15 Min      98657 (CPT®) Hc Ot Ther Proc Ea 15 Min      79164 (CPT®) Hc Ot Therapeutic Act Ea 15 Min 9 1    04559 (CPT®) Hc Ot Manual Therapy Ea 15 Min      89700 (CPT®) Hc Ot Iontophoresis Ea 15 Min      75207 (CPT®) Hc Ot Elec Stim Ea-Per 15 Min      16619 (CPT®) Hc Ot Ultrasound Ea 15 Min      92529 (CPT®) Hc Ot Self Care/Mgmt/Train Ea 15 Min 20 1    Total  29 2        Therapy Charges for Today     Code Description Service Date Service Provider Modifiers Qty    74336028321 HC OT THERAPEUTIC ACT EA 15 MIN 2/12/2019 Felicitas Prado OT GO 1    99142651534 HC OT SELF CARE/MGMT/TRAIN EA 15 MIN 2/12/2019 Felicitas Prado OT GO 1    26450420006 HC OT EVAL LOW COMPLEXITY 3 2/12/2019 Felicitas Prado OT GO 1               Felicitas Prado OT  2/12/2019

## 2019-02-12 NOTE — PLAN OF CARE
Problem: Patient Care Overview  Goal: Plan of Care Review  Outcome: Ongoing (interventions implemented as appropriate)   02/12/19 1141   Plan of Care Review   Progress improving   OTHER   Outcome Summary Patient ambulated 100 feet with RW and step to gait pattern, limited by pain. CGA for all mobility. Will continue to progress mobility training and strengthening as able.    Coping/Psychosocial   Plan of Care Reviewed With patient;daughter;friend       Problem: Hip Arthroplasty (Total, Partial) (Adult)  Goal: Signs and Symptoms of Listed Potential Problems Will be Absent, Minimized or Managed (Hip Arthroplasty)  Outcome: Ongoing (interventions implemented as appropriate)   02/12/19 1141   Goal/Outcome Evaluation   Problems Assessed (Hip Arthroplasty) functional deficit;joint dislocation;pain   Problems Present (Hip Arthroplasty) functional deficit;pain;joint dislocation

## 2019-02-12 NOTE — NURSING NOTE
0630 pt was unresponsive when RN came into room to admin AM meds, Rapid Response called, EKG, vitals, labs collected. Dr DORAN was called order for CT given and performed. Pt slowly returning to baseline, bolus fluids ordered by Dr DORAN,  given when back on floor. A/O @ 0720. VS approaching baseline.

## 2019-02-12 NOTE — PLAN OF CARE
Problem: Patient Care Overview  Goal: Plan of Care Review  Outcome: Ongoing (interventions implemented as appropriate)   02/12/19 8263   Plan of Care Review   Progress improving   OTHER   Outcome Summary OT evaluation completed. Pt. educated on AE use with bed mobility, dressing and bathing. Pt. CGA to min A for ADL's, transfers and bed moblity. Pt. limited by pain. Pt. will benefit from continued skilled OT services to continue education and promote return to independence.    Coping/Psychosocial   Plan of Care Reviewed With patient;family

## 2019-02-12 NOTE — THERAPY TREATMENT NOTE
Acute Care - Physical Therapy Treatment Note  [facility]     Patient Name: Susy Fitzpatrick  : 1950  MRN: [id number]  Today's Date: 2019  Onset of Illness/Injury or Date of Surgery: 19  Date of Referral to PT: 19  Referring Physician: MD Ike    Admit Date: 2019    Visit Dx:    ICD-10-CM ICD-9-CM   1. Impaired functional mobility, balance, gait, and endurance Z74.09 V49.89   2. Status post total replacement of right hip Z96.641 V43.64     Patient Active Problem List   Diagnosis   • Diffuse large B-cell lymphoma of lymph nodes of neck (CMS/HCC)   • Arthritis of right hip   • GERD (gastroesophageal reflux disease)   • Status post total replacement of right hip   • Acute blood loss anemia   • Acute postoperative pain       Therapy Treatment    Rehabilitation Treatment Summary     Row Name 19 1141             Treatment Time/Intention    Discipline  physical therapist  -LR      Document Type  therapy note (daily note)  -LR      Subjective Information  complains of;pain  -LR      Mode of Treatment  physical therapy;individual therapy  -LR      Patient/Family Observations  Patient supine in bed upon arrival. IV, pulse ox, SCDs, exit alarm. Daughter and friend present.   -LR      Care Plan Review  care plan/treatment goals reviewed;risks/benefits reviewed;current/potential barriers reviewed;patient/other agree to care plan  -LR      Care Plan Review, Other Participant(s)  friend;daughter  -LR      Patient Effort  adequate  -LR      Existing Precautions/Restrictions  fall;right;hip, anterior  -LR      Recorded by [LR] Tiesha Fernandez, PT 19 1333      Row Name 19 1141             Cognitive Assessment/Intervention- PT/OT    Orientation Status (Cognition)  oriented x 4  -LR      Follows Commands (Cognition)  WFL;follows one step commands;over 90% accuracy;verbal cues/prompting required;repetition of directions required  -LR      Safety Deficit (Cognitive)  mild  deficit;safety precautions awareness;safety precautions follow-through/compliance  -LR      Recorded by [LR] Tiesha Fernandez, PT 02/12/19 1333      Row Name 02/12/19 1141             Safety Issues, Functional Mobility    Safety Issues Affecting Function (Mobility)  safety precaution awareness;safety precautions follow-through/compliance;sequencing abilities  -LR      Recorded by [LR] Tiesha Fernandez, PT 02/12/19 1333      Row Name 02/12/19 1141             Mobility Assessment/Intervention    Extremity Weight-bearing Status  right lower extremity  -LR      Right Lower Extremity (Weight-bearing Status)  weight-bearing as tolerated (WBAT)  -LR      Recorded by [LR] Tiesha Fernandez, PT 02/12/19 1333      Row Name 02/12/19 1141             Bed Mobility Assessment/Treatment    Bed Mobility Assessment/Treatment  supine-sit  -LR      Supine-Sit [place] (Bed Mobility)  verbal cues;contact guard  -LR      Bed Mobility, Safety Issues  decreased use of legs for bridging/pushing  -LR      Assistive Device (Bed Mobility)  head of bed elevated;bed rails  -LR      Comment (Bed Mobility)  Verbal cues to move LEs towards EOB and to push up from bed to raise trunk into sitting and to scoot hips out to get feet on floor. CGA to R LE. Required increased time to perform.   -LR      Recorded by [LR] Tiesha Fernandez, PT 02/12/19 1333      Row Name 02/12/19 1141             Transfer Assessment/Treatment    Transfer Assessment/Treatment  sit-stand transfer;stand-sit transfer  -LR      Comment (Transfers)  Verbal cues to push up from bed to stand and to reach back for chair to lower into sitting. Verbal cues to step R LE out before t/f for comfort.   -LR      Recorded by [LR] Tiesha Fernandez, PT 02/12/19 1333      Row Name 02/12/19 1141             Sit-Stand Transfer    Sit-Stand [place] (Transfers)  verbal cues;contact guard  -LR      Assistive Device (Sit-Stand Transfers)  walker, front-wheeled   -LR      Recorded by [LR] Tiesha Fernandez, PT 02/12/19 1333      Row Name 02/12/19 1141             Stand-Sit Transfer    Stand-Sit [place] (Transfers)  verbal cues;contact guard  -LR      Assistive Device (Stand-Sit Transfers)  walker, front-wheeled  -LR      Recorded by [LR] Tiesha Fernandez, PT 02/12/19 1333      Row Name 02/12/19 1141             Gait/Stairs Assessment/Training    18096 - Gait Training Minutes   12  -LR      [place] Level (Gait)  verbal cues;contact guard  -LR      Assistive Device (Gait)  walker, front-wheeled  -LR      Distance in Feet (Gait)  100  -LR      Pattern (Gait)  step-to  -LR      Deviations/Abnormal Patterns (Gait)  right sided deviations;antalgic;bilateral deviations;kari decreased;gait speed decreased;stride length decreased  -LR      Bilateral Gait Deviations  forward flexed posture;heel strike decreased  -LR      Right Sided Gait Deviations  weight shift ability decreased  -LR      Comment (Gait/Stairs)  Patient ambulated with step to gait pattern at slow pace. Verbal cues for correct sequencing of steps, increased R LE weight bearing/stance phase, decreased UE weight bearing, and increased step length. Improved with cues for correction. Gait limited by pain.   -LR      Recorded by [LR] Tiesha Fernandez, PT 02/12/19 1333      Row Name 02/12/19 1141             Therapeutic Exercise    41742 - PT Therapeutic Exercise Minutes  11  -LR      Recorded by [LR] Tiesha Fernandez, PT 02/12/19 1333      Row Name 02/12/19 1141             Therapeutic Exercise    Lower Extremity (Therapeutic Exercise)  gluteal sets;heel slides, right;quad sets, right;SAQ (short arc quad), right;SLR (straight leg raise), right  -LR      Lower Extremity Range of Motion (Therapeutic Exercise)  hip abduction/adduction, right;ankle dorsiflexion/plantar flexion, right  -LR      Exercise Type (Therapeutic Exercise)  AROM (active range of motion);AAROM (active assistive range of  motion);isometric contraction, static;isotonic contraction, concentric  -LR      Position (Therapeutic Exercise)  supine  -LR      Sets/Reps (Therapeutic Exercise)  x15 reps each  -LR      Comment (Therapeutic Exercise)  cues for technique; mod assist SLR, min assist heel slides  -LR      Recorded by [LR] Tiesha Fernandez, PT 02/12/19 1333      Row Name 02/12/19 1141             Positioning and Restraints    Pre-Treatment Position  in bed  -LR      Post Treatment Position  chair  -LR      In Chair  notified nsg;reclined;sitting;call light within reach;encouraged to call for assist;with family/caregiver;exit alarm on;compression device;legs elevated  -LR      Recorded by [LR] Tiesha Fernandez, PT 02/12/19 1333      Row Name 02/12/19 1141             Pain Assessment    Additional Documentation  Pain Scale: Numbers Pre/Post-Treatment (Group)  -LR      Recorded by [LR] Tiesha Fernandez, PT 02/12/19 1333      Row Name 02/12/19 1141             Pain Scale: Numbers Pre/Post-Treatment    Pain Scale: Numbers, Pretreatment  4/10  -LR      Pain Scale: Numbers, Post-Treatment  6/10  -LR      Pain Location - Side  Right  -LR      Pain Location  hip  -LR      Pain Intervention(s)  Repositioned;Ambulation/increased activity  -LR      Recorded by [LR] Tiesha Fernandez, PT 02/12/19 1333      Row Name                Wound 02/11/19 1028 Right hip incision    Wound - Properties Group Date first assessed: 02/11/19 [RV] Time first assessed: 1028 [RV] Side: Right [RV] Location: hip [RV] Type: incision [RV] Recorded by:  [RV] Abram Zheng RN 02/11/19 1028    Row Name 02/12/19 1141             Coping    Observed Emotional State  accepting;cooperative  -LR      Verbalized Emotional State  acceptance  -LR      Recorded by [LR] Tiesha Fernandez, PT 02/12/19 1333      Row Name 02/12/19 1141             Plan of Care Review    Plan of Care Reviewed With  patient;daughter;friend  -LR      Recorded by [LR]  Tiesha Fernandez, PT 02/12/19 1333      Row Name 02/12/19 1141             Outcome Summary/Treatment Plan (PT)    Daily Summary of Progress (PT)  progress toward functional goals is gradual  -LR      Recorded by [LR] Tiesha Fernandezhryn, PT 02/12/19 1333        User Key  (r) = Recorded By, (t) = Taken By, (c) = Cosigned By    Initials Name Effective Dates Discipline    LR Tiesha Fernandez, PT 06/19/15 -  PT    Abram Roe, RN 06/16/16 -  Nurse          Wound 02/11/19 1028 Right hip incision (Active)   Dressing Appearance dry;intact;no drainage 2/12/2019  8:00 AM   Closure Liquid skin adhesive 2/12/2019  8:00 AM   Periwound intact 2/11/2019  6:26 PM   Drainage Amount none 2/12/2019  8:00 AM           Physical Therapy Education     Title: PT OT SLP Therapies (Done)     Topic: Physical Therapy (Done)     Point: Mobility training (Done)     Learning Progress Summary           Patient Acceptance, E,D, VU,NR by LR at 2/12/2019 11:41 AM    Comment:  Educated on hip precautions, weight bearing status, correct supine to sit t/f technique, correct sit<->stand t/f technique, correct gait mechanics, benefits of mobility, and progression of POC.    Acceptance, E,D, VU,NR by LR at 2/11/2019  2:46 PM    Comment:  Educated on hip precautions, weight bearing status, correct supine to sit t/f technique, correct sit<->stand t/f technique, correct gait mechanics, and progression of POC.   Family Acceptance, E,D, VU,NR by LR at 2/12/2019 11:41 AM    Comment:  Educated on hip precautions, weight bearing status, correct supine to sit t/f technique, correct sit<->stand t/f technique, correct gait mechanics, benefits of mobility, and progression of POC.    Acceptance, E,D, VU,NR by LR at 2/11/2019  2:46 PM    Comment:  Educated on hip precautions, weight bearing status, correct supine to sit t/f technique, correct sit<->stand t/f technique, correct gait mechanics, and progression of POC.                   Point:  Home exercise program (Done)     Learning Progress Summary           Patient Acceptance, E,D, VU,NR by LR at 2/12/2019 11:41 AM    Comment:  Educated on hip precautions, weight bearing status, correct supine to sit t/f technique, correct sit<->stand t/f technique, correct gait mechanics, benefits of mobility, and progression of POC.    Acceptance, E,D, VU,NR by LR at 2/11/2019  2:46 PM    Comment:  Educated on hip precautions, weight bearing status, correct supine to sit t/f technique, correct sit<->stand t/f technique, correct gait mechanics, and progression of POC.   Family Acceptance, E,D, VU,NR by LR at 2/12/2019 11:41 AM    Comment:  Educated on hip precautions, weight bearing status, correct supine to sit t/f technique, correct sit<->stand t/f technique, correct gait mechanics, benefits of mobility, and progression of POC.    Acceptance, E,D, VU,NR by LR at 2/11/2019  2:46 PM    Comment:  Educated on hip precautions, weight bearing status, correct supine to sit t/f technique, correct sit<->stand t/f technique, correct gait mechanics, and progression of POC.                   Point: Body mechanics (Done)     Learning Progress Summary           Patient Acceptance, E,D, VU,NR by LR at 2/12/2019 11:41 AM    Comment:  Educated on hip precautions, weight bearing status, correct supine to sit t/f technique, correct sit<->stand t/f technique, correct gait mechanics, benefits of mobility, and progression of POC.    Acceptance, E,D, VU,NR by LR at 2/11/2019  2:46 PM    Comment:  Educated on hip precautions, weight bearing status, correct supine to sit t/f technique, correct sit<->stand t/f technique, correct gait mechanics, and progression of POC.   Family Acceptance, E,D, VU,NR by LR at 2/12/2019 11:41 AM    Comment:  Educated on hip precautions, weight bearing status, correct supine to sit t/f technique, correct sit<->stand t/f technique, correct gait mechanics, benefits of mobility, and progression of POC.     Acceptance, E,D, VU,NR by LR at 2/11/2019  2:46 PM    Comment:  Educated on hip precautions, weight bearing status, correct supine to sit t/f technique, correct sit<->stand t/f technique, correct gait mechanics, and progression of POC.                   Point: Precautions (Done)     Learning Progress Summary           Patient Acceptance, E,D, VU,NR by LR at 2/12/2019 11:41 AM    Comment:  Educated on hip precautions, weight bearing status, correct supine to sit t/f technique, correct sit<->stand t/f technique, correct gait mechanics, benefits of mobility, and progression of POC.    Acceptance, E,D, VU,NR by LR at 2/11/2019  2:46 PM    Comment:  Educated on hip precautions, weight bearing status, correct supine to sit t/f technique, correct sit<->stand t/f technique, correct gait mechanics, and progression of POC.   Family Acceptance, E,D, VU,NR by LR at 2/12/2019 11:41 AM    Comment:  Educated on hip precautions, weight bearing status, correct supine to sit t/f technique, correct sit<->stand t/f technique, correct gait mechanics, benefits of mobility, and progression of POC.    Acceptance, E,D, VU,NR by LR at 2/11/2019  2:46 PM    Comment:  Educated on hip precautions, weight bearing status, correct supine to sit t/f technique, correct sit<->stand t/f technique, correct gait mechanics, and progression of POC.                               User Key     Initials Effective Dates Name Provider Type Discipline     06/19/15 -  Tiesha Fernandez, PT Physical Therapist PT                PT Recommendation and Plan  Anticipated Discharge Disposition (PT): home with assist, home with home health  Planned Therapy Interventions (PT Eval): balance training, bed mobility training, gait training, home exercise program, patient/family education, ROM (range of motion), stair training, strengthening, transfer training  Therapy Frequency (PT Clinical Impression): 2 times/day  Outcome Summary/Treatment Plan (PT)  Daily Summary of  Progress (PT): progress toward functional goals is gradual  Anticipated Equipment Needs at Discharge (PT): front wheeled walker, bedside commode  Anticipated Discharge Disposition (PT): home with assist, home with home health  Plan of Care Reviewed With: patient, daughter, friend  Progress: improving  Outcome Summary: Patient ambulated 100 feet with RW and step to gait pattern, limited by pain. CGA for all mobility. Will continue to progress mobility training and strengthening as able.   Outcome Measures     Row Name 02/12/19 1141 02/11/19 1446          How much help from another person do you currently need...    Turning from your back to your side while in flat bed without using bedrails?  3  -LR  3  -LR     Moving from lying on back to sitting on the side of a flat bed without bedrails?  3  -LR  3  -LR     Moving to and from a bed to a chair (including a wheelchair)?  3  -LR  3  -LR     Standing up from a chair using your arms (e.g., wheelchair, bedside chair)?  3  -LR  3  -LR     Climbing 3-5 steps with a railing?  3  -LR  3  -LR     To walk in hospital room?  3  -LR  3  -LR     AM-PAC 6 Clicks Score  18  -LR  18  -LR        Functional Assessment    Outcome Measure Options  AM-PAC 6 Clicks Basic Mobility (PT)  -LR  AM-PAC 6 Clicks Basic Mobility (PT)  -LR       User Key  (r) = Recorded By, (t) = Taken By, (c) = Cosigned By    Initials Name Provider Type    Tiesha Jim, PT Physical Therapist         Time Calculation:   PT Charges     Row Name 02/12/19 1141             Time Calculation    Start Time  1141  -LR      PT Received On  02/12/19  -LR      PT Goal Re-Cert Due Date  02/21/19  -LR         Time Calculation- PT    Total Timed Code Minutes- PT  23 minute(s)  -LR         Timed Charges    30942 - PT Therapeutic Exercise Minutes  11  -LR      13310 - Gait Training Minutes   12  -LR        User Key  (r) = Recorded By, (t) = Taken By, (c) = Cosigned By    Initials Name Provider Type    LUBNA Fernandez  Tiesha Armas, PT Physical Therapist        Therapy Suggested Charges     Code   Minutes Charges    57658 (CPT®) Hc Pt Neuromusc Re Education Ea 15 Min      17832 (CPT®) Hc Pt Ther Proc Ea 15 Min 11 1    71746 (CPT®) Hc Gait Training Ea 15 Min 12 1    70338 (CPT®) Hc Pt Therapeutic Act Ea 15 Min      28508 (CPT®) Hc Pt Manual Therapy Ea 15 Min      34280 (CPT®) Hc Pt Iontophoresis Ea 15 Min      53876 (CPT®) Hc Pt Elec Stim Ea-Per 15 Min      14856 (CPT®) Hc Pt Ultrasound Ea 15 Min      14162 (CPT®) Hc Pt Self Care/Mgmt/Train Ea 15 Min      00584 (CPT®) Hc Pt Prosthetic (S) Train Initial Encounter, Each 15 Min      20054 (CPT®) Hc Pt Orthotic(S)/Prosthetic(S) Encounter, Each 15 Min      33788 (CPT®) Hc Orthotic(S) Mgmt/Train Initial Encounter, Each 15min      Total  23 2        Therapy Charges for Today     Code Description Service Date Service Provider Modifiers Qty    23343190811 HC GAIT TRAINING EA 15 MIN 2/11/2019 Tiesha Fernandez, PT GP 1    14190202008 HC PT THER SUPP EA 15 MIN 2/11/2019 Tiesha Fernandez, PT GP 2    39230275217 HC PT EVAL LOW COMPLEXITY 3 2/11/2019 Tiesha Fernandez, PT GP 1    64163658530 HC PT THER PROC EA 15 MIN 2/12/2019 Tiesha Fernandez, PT GP 1    01335815234 HC GAIT TRAINING EA 15 MIN 2/12/2019 Tiesha Fernandez, PT GP 1          PT G-Codes  Outcome Measure Options: AM-PAC 6 Clicks Basic Mobility (PT)  AM-PAC 6 Clicks Score: 18    Tiesha Fernandez, PT  2/12/2019

## 2019-02-13 ENCOUNTER — APPOINTMENT (OUTPATIENT)
Dept: GENERAL RADIOLOGY | Facility: HOSPITAL | Age: 69
End: 2019-02-13

## 2019-02-13 ENCOUNTER — APPOINTMENT (OUTPATIENT)
Dept: MRI IMAGING | Facility: HOSPITAL | Age: 69
End: 2019-02-13

## 2019-02-13 ENCOUNTER — APPOINTMENT (OUTPATIENT)
Dept: NEUROLOGY | Facility: HOSPITAL | Age: 69
End: 2019-02-13

## 2019-02-13 LAB
ANION GAP SERPL CALCULATED.3IONS-SCNC: 4 MMOL/L (ref 3–11)
BUN BLD-MCNC: 14 MG/DL (ref 9–23)
BUN/CREAT SERPL: 17.9 (ref 7–25)
CALCIUM SPEC-SCNC: 8.7 MG/DL (ref 8.7–10.4)
CHLORIDE SERPL-SCNC: 110 MMOL/L (ref 99–109)
CK SERPL-CCNC: 726 U/L (ref 26–174)
CO2 SERPL-SCNC: 25 MMOL/L (ref 20–31)
CREAT BLD-MCNC: 0.78 MG/DL (ref 0.6–1.3)
DEPRECATED RDW RBC AUTO: 45.8 FL (ref 37–54)
ERYTHROCYTE [DISTWIDTH] IN BLOOD BY AUTOMATED COUNT: 14 % (ref 11.3–14.5)
GFR SERPL CREATININE-BSD FRML MDRD: 74 ML/MIN/1.73
GLUCOSE BLD-MCNC: 104 MG/DL (ref 70–100)
GLUCOSE BLDC GLUCOMTR-MCNC: 113 MG/DL (ref 70–130)
HCT VFR BLD AUTO: 25.9 % (ref 34.5–44)
HGB BLD-MCNC: 8.5 G/DL (ref 11.5–15.5)
MCH RBC QN AUTO: 29.1 PG (ref 27–31)
MCHC RBC AUTO-ENTMCNC: 32.8 G/DL (ref 32–36)
MCV RBC AUTO: 88.7 FL (ref 80–99)
PLATELET # BLD AUTO: 147 10*3/MM3 (ref 150–450)
PMV BLD AUTO: 9.5 FL (ref 6–12)
POTASSIUM BLD-SCNC: 3.8 MMOL/L (ref 3.5–5.5)
RBC # BLD AUTO: 2.92 10*6/MM3 (ref 3.89–5.14)
SODIUM BLD-SCNC: 139 MMOL/L (ref 132–146)
WBC NRBC COR # BLD: 5.45 10*3/MM3 (ref 3.5–10.8)

## 2019-02-13 PROCEDURE — 25010000002 ONDANSETRON PER 1 MG: Performed by: NURSE PRACTITIONER

## 2019-02-13 PROCEDURE — 70553 MRI BRAIN STEM W/O & W/DYE: CPT

## 2019-02-13 PROCEDURE — 80048 BASIC METABOLIC PNL TOTAL CA: CPT | Performed by: NURSE PRACTITIONER

## 2019-02-13 PROCEDURE — 95816 EEG AWAKE AND DROWSY: CPT

## 2019-02-13 PROCEDURE — A9577 INJ MULTIHANCE: HCPCS | Performed by: ORTHOPAEDIC SURGERY

## 2019-02-13 PROCEDURE — 0 GADOBENATE DIMEGLUMINE 529 MG/ML SOLUTION: Performed by: ORTHOPAEDIC SURGERY

## 2019-02-13 PROCEDURE — 70200 X-RAY EXAM OF EYE SOCKETS: CPT

## 2019-02-13 PROCEDURE — 85027 COMPLETE CBC AUTOMATED: CPT | Performed by: NURSE PRACTITIONER

## 2019-02-13 PROCEDURE — 82962 GLUCOSE BLOOD TEST: CPT

## 2019-02-13 PROCEDURE — 25010000002 PROCHLORPERAZINE EDISYLATE PER 10 MG: Performed by: NURSE PRACTITIONER

## 2019-02-13 PROCEDURE — 82550 ASSAY OF CK (CPK): CPT | Performed by: INTERNAL MEDICINE

## 2019-02-13 RX ORDER — PROCHLORPERAZINE 25 MG
25 SUPPOSITORY, RECTAL RECTAL EVERY 12 HOURS PRN
Status: DISCONTINUED | OUTPATIENT
Start: 2019-02-13 | End: 2019-02-14 | Stop reason: HOSPADM

## 2019-02-13 RX ORDER — PROCHLORPERAZINE MALEATE 5 MG/1
5 TABLET ORAL EVERY 6 HOURS PRN
Status: DISCONTINUED | OUTPATIENT
Start: 2019-02-13 | End: 2019-02-14 | Stop reason: HOSPADM

## 2019-02-13 RX ADMIN — ONDANSETRON 4 MG: 2 INJECTION INTRAMUSCULAR; INTRAVENOUS at 10:35

## 2019-02-13 RX ADMIN — ACETAMINOPHEN 650 MG: 325 TABLET ORAL at 06:23

## 2019-02-13 RX ADMIN — PROCHLORPERAZINE EDISYLATE 5 MG: 5 INJECTION INTRAMUSCULAR; INTRAVENOUS at 15:47

## 2019-02-13 RX ADMIN — ASPIRIN 325 MG: 325 TABLET, DELAYED RELEASE ORAL at 17:59

## 2019-02-13 RX ADMIN — SODIUM CHLORIDE 150 ML/HR: 9 INJECTION, SOLUTION INTRAVENOUS at 08:45

## 2019-02-13 RX ADMIN — SODIUM CHLORIDE 150 ML/HR: 9 INJECTION, SOLUTION INTRAVENOUS at 20:02

## 2019-02-13 RX ADMIN — ACETAMINOPHEN 650 MG: 325 TABLET ORAL at 18:00

## 2019-02-13 RX ADMIN — DOCUSATE SODIUM 100 MG: 100 CAPSULE, LIQUID FILLED ORAL at 20:14

## 2019-02-13 RX ADMIN — GADOBENATE DIMEGLUMINE 14 ML: 529 INJECTION, SOLUTION INTRAVENOUS at 15:00

## 2019-02-13 RX ADMIN — PANTOPRAZOLE SODIUM 40 MG: 40 TABLET, DELAYED RELEASE ORAL at 06:23

## 2019-02-13 RX ADMIN — HYDROCODONE BITARTRATE AND ACETAMINOPHEN 2 TABLET: 5; 325 TABLET ORAL at 06:23

## 2019-02-13 NOTE — PROGRESS NOTES
"Susy Honeycutt [facility]  [id number]  1950    /67 (BP Location: Left arm, Patient Position: Lying)   Pulse 72   Temp 98.1 °F (36.7 °C)   Resp 18   Ht 157.5 cm (62\")   Wt 83.4 kg (183 lb 13.8 oz)   LMP 03/20/2001 Comment: mammogram- 2018   SpO2 97%   BMI 33.63 kg/m²     Lab Results (last 24 hours)     Procedure Component Value Units Date/Time    CBC (No Diff) [[id number]]  (Abnormal) Collected:  02/13/19 0649    Specimen:  Blood Updated:  02/13/19 1027     WBC 5.45 10*3/mm3      RBC 2.92 10*6/mm3      Hemoglobin 8.5 g/dL      Hematocrit 25.9 %      MCV 88.7 fL      MCH 29.1 pg      MCHC 32.8 g/dL      RDW 14.0 %      RDW-SD 45.8 fl      MPV 9.5 fL      Platelets 147 10*3/mm3     Basic Metabolic Panel [[id number]]  (Abnormal) Collected:  02/13/19 0649    Specimen:  Blood Updated:  02/13/19 1020     Glucose 104 mg/dL      BUN 14 mg/dL      Creatinine 0.78 mg/dL      Sodium 139 mmol/L      Potassium 3.8 mmol/L      Chloride 110 mmol/L      CO2 25.0 mmol/L      Calcium 8.7 mg/dL      eGFR Non African Amer 74 mL/min/1.73      BUN/Creatinine Ratio 17.9     Anion Gap 4.0 mmol/L     Narrative:       [employer] Guidelines    Stage     Description        GFR  1         Normal or High     90+  2         Mild decrease      60-89  3         Moderate decrease  30-59  4         Severe decrease    15-29  5         Kidney failure     <15    The MDRD GFR formula is only valid for adults with stable renal function between ages 18 and 70.    POC Glucose Once [[id number]]  (Normal) Collected:  02/13/19 0753    Specimen:  Blood Updated:  02/13/19 0803     Glucose 113 mg/dL     CK [[id number]]  (Abnormal) Collected:  02/13/19 0649    Specimen:  Blood Updated:  02/13/19 0724     Creatine Kinase 726 U/L           Patient Care Team:  Blayne Kirby MD as PCP - General (Family Medicine)  Blayne Kirby MD as PCP - Claims Attributed    SUBJECTIVE  Pain well controlled  Satisfactory progress with PT    PHYSICAL " EXAM  No major changes to neurovascular status      Status post total replacement of right hip    Arthritis of right hip    GERD (gastroesophageal reflux disease)    Acute blood loss anemia    Acute postoperative pain      PLAN / DISPOSITION:  Home with Home PT if cleared by PT here and if cleared by Dr. ANDREEA Ramires MD  02/13/19  4:22 PM

## 2019-02-13 NOTE — PROGRESS NOTES
"IM progress note      Susy Fitzpatrick  [id number]  1950     LOS: 2 days     Attending: Esdras Ramires MD    Primary Care Provider: Blayne Kirby MD      Chief Complaint/Reason for visit:  Right hip pain    Subjective   Had episode 19; was ambulating to BR and complained of HA, dizzy and clammy. She was found by nursing staff about 20 min later unresponsive. RRT was called, pt returned to baseline.     This morning she continues to complain of dizziness and vomiting. Adequate pain control.     Objective     Vital Signs  Visit Vitals  /55   Pulse 68   Temp 97.9 °F (36.6 °C) (Oral)   Resp 18   Ht 157.5 cm (62\")   Wt 83.4 kg (183 lb 13.8 oz)   LMP 2001 Comment: mammogram-     SpO2 97%   BMI 33.63 kg/m²     Temp (24hrs), Av.3 °F (36.8 °C), Min:97.9 °F (36.6 °C), Max:98.6 °F (37 °C)      Nutrition: PO    Respiratory: RA    Physical Therapy: ()- Patient ambulated 200 feet with RW and step through gait pattern, continues to be limited by pain. CGA for all mobility. Will continue to progress mobility training, ROM, and strengthening as able. Will initiate stair training in AM.     Physical Exam:     General Appearance:    Alert, cooperative, in no acute distress   Head:    Normocephalic, without obvious abnormality, atraumatic    Lungs:     Normal effort, symmetric chest rise, no crepitus, clear to      auscultation bilaterally             Heart:    Regular rhythm and normal rate, normal S1 and S2   Abdomen:     Normal bowel sounds, no masses, no organomegaly, soft        non-tender, non-distended, no guarding, no rebound                tenderness   Extremities:   No clubbing, cyanosis or edema.  No deformities. Right hip Prineo CDI   Pulses:   Pulses palpable and equal bilaterally   Skin:   No bleeding, bruising or rash   Neurologic:   Moves all extremities with no obvious focal motor deficit.  Cranial nerves 2 - 12 grossly intact. Flexion and dorsiflexion intact bilateral feet.   "     Results Review:     I reviewed the patient's new clinical results.   Results from last 7 days   Lab Units 02/12/19  1410 02/12/19  0654 02/12/19  0550   WBC 10*3/mm3  --  7.99 7.15   HEMOGLOBIN g/dL 9.3* 9.9* 9.8*   HEMATOCRIT % 28.4* 30.2* 29.7*   PLATELETS 10*3/mm3  --  160 172     Results from last 7 days   Lab Units 02/12/19  0550   SODIUM mmol/L 136   POTASSIUM mmol/L 4.3   CHLORIDE mmol/L 103   CO2 mmol/L 27.0   BUN mg/dL 14   CREATININE mg/dL 0.82   CALCIUM mg/dL 9.0   GLUCOSE mg/dL 140*   Results for VU MORALES (MRN [id number]) as of 2/12/2019 10:10   Ref. Range 2/12/2019 05:50   Creatine Kinase Latest Ref Range: 26 - 174 U/L 728 (H)   CKMB Latest Ref Range: 0.00 - 5.00 ng/mL 6.11 (H)   CK-MB Index Latest Ref Range: 0.0 - 3.0 % 0.8   Results for VU MORALES (MRN [id number]) as of 2/12/2019 10:10   Ref. Range 2/12/2019 06:54   Troponin I Latest Ref Range: <=0.039 ng/mL <0.006   BNP Latest Ref Range: 0.0 - 100.0 pg/mL 33.0   Results for VU MORALES (MRN [id number]) as of 2/12/2019 10:10   Ref. Range 2/12/2019 06:54   Phosphorus Latest Ref Range: 2.4 - 5.1 mg/dL 3.3   Lactate Latest Ref Range: 0.5 - 2.0 mmol/L 1.9   Magnesium Latest Ref Range: 1.3 - 2.7 mg/dL 1.7   Study Result     EXAM:    CT Head Without Contrast      EXAM DATE/TIME:    2/12/2019 6:55 AM      CLINICAL HISTORY:    68 years old, female; Other reduced mobility; Signs and symptoms; Altered   mental status/memory loss; Additional info: Confusion/delirium, altered loc,   unexplained      TECHNIQUE:    Axial computed tomography images of the head/brain without contrast.    All CT scans at this facility use at least one of these dose optimization   techniques: automated exposure control; mA and/or kV adjustment per patient   size (includes targeted exams where dose is matched to clinical indication); or   iterative reconstruction.      COMPARISON:    No relevant prior studies available.      FINDINGS:    Brain: Normal. No  hemorrhage. No significant white matter disease. No edema.    Ventricles: Normal. No ventriculomegaly.    Bones/joints: No acute abnormality identified.  No acute fracture.    Sinuses: Visualized sinuses are unremarkable. No acute sinusitis.    Mastoid air cells: Visualized mastoid air cells are unremarkable. No mastoid   effusion.    Soft tissues: Unremarkable.    Vasculature:  Atherosclerotic calcifications are present involving the carotid   artery siphons bilaterally.      IMPRESSION:  No acute intracranial abnormality identified.      ECG 12 Lead   Order: [id number]   Status:  Preliminary result   Visible to patient:  No (Not Released) Next appt:  08/21/2019 at 01:30 PM in Oncology (Nay Austin, APRN)      Narrative     Test Reason : slow response  Blood Pressure : **/** mmHG  Vent. Rate : 063 BPM     Atrial Rate : 063 BPM     P-R Int : 144 ms          QRS Dur : 088 ms      QT Int : 460 ms       P-R-T Axes : 040 000 049 degrees     QTc Int : 470 ms    Normal sinus rhythm  Normal ECG  When compared with ECG of 29-JAN-2019 15:44,  No significant change was found    Referred By:  Y           Confirmed By:              I reviewed the patient's new imaging including images and reports.    All medications reviewed.     acetaminophen 650 mg Oral Q6H   aspirin 325 mg Oral Daily   docusate sodium 100 mg Oral BID   pantoprazole 40 mg Oral QAM   sodium chloride 3 mL Intravenous Q12H       Assessment/Plan     Status post total replacement of right hip    Arthritis of right hip    GERD (gastroesophageal reflux disease)    Acute blood loss anemia    Acute postoperative pain    Orthostasis    Altered mental status episode, suspect syncope that's vasovagal or orthostatic    Dizziness    PONV    Plan  1. PT/OT- WBAT RLE  2. Pain control-prns   3. IS-encouraged  4. DVT proph- mechs/ASA  5. Bowel regimen  6. Monitor post-op labs  7. DC planning for home    STAT CBC, BMP, MRI head, EEG  Rule out stroke. Follow up ABLA.      EKG- SR  CT head-neg  Total of 2 liters IVF 2/12  Encouraged caffeine  Bladder scan- r/o AUR  Tele  Orthostatic BPs( positive/ volume repletion ordered)    GERD  - Continue PPI      Scribed for Dr. Yeh by JUAN Cuba. 2/13/2019  10:19 AM    JUAN Cuba  02/13/19  10:19 AM   Pamela POOL MD, personally performed the services described in this documentation as scribed by JUAN Cuba ,and it is both accurate and complete.   MRI noted/ no acute event.   EEG, mild generalized slowing, no epileptiform activity.  H/H lower, but still adequate. Dilution likely a factor.

## 2019-02-13 NOTE — PLAN OF CARE
Problem: Patient Care Overview  Goal: Plan of Care Review  Outcome: Ongoing (interventions implemented as appropriate)   02/12/19 2108   Plan of Care Review   Progress improving   OTHER   Outcome Summary pain much improved after one dose of toradol, orthostatics BP checked this am, BP dropped after ambulated to BR and back to bed. IVF continue to infuse. Voiding well. VSS   Coping/Psychosocial   Plan of Care Reviewed With patient

## 2019-02-13 NOTE — PROGRESS NOTES
Continued Stay Note   Prasanth     Patient Name: Susy Fitzpatrick  MRN: [id number]  Today's Date: 2/13/2019    Admit Date: 2/11/2019    Discharge Plan     Row Name 02/13/19 1407       Plan    Plan  Home with family and [facility]     Patient/Family in Agreement with Plan  yes    Plan Comments  Spoke with patient and family at bedside.  Patient not feeling well today.  States she is still in a considerable amount of pain and very nauseated. Plans for home tomorrow if she is feeling better.  States that she will be going home with family to assist.  Spoke with Christianne at [facility].  She has referral and EPIC order for home health. Case management will contact [facility] to notify of patient's discharge. Patient has rolling walker and bedside commode in room delivered from [place].  Family will transport home in private vehicle.  Case management will continue to follow for discharge planning needs.      Final Discharge Disposition Code  06 - home with home health care        Discharge Codes    No documentation.       Expected Discharge Date and Time     Expected Discharge Date Expected Discharge Time    Feb 13, 2019             Nesha Gutierrez RN

## 2019-02-14 VITALS
HEART RATE: 54 BPM | BODY MASS INDEX: 33.84 KG/M2 | OXYGEN SATURATION: 97 % | SYSTOLIC BLOOD PRESSURE: 142 MMHG | RESPIRATION RATE: 18 BRPM | DIASTOLIC BLOOD PRESSURE: 87 MMHG | WEIGHT: 183.86 LBS | HEIGHT: 62 IN | TEMPERATURE: 98.7 F

## 2019-02-14 PROCEDURE — 97110 THERAPEUTIC EXERCISES: CPT

## 2019-02-14 RX ORDER — PSEUDOEPHEDRINE HCL 30 MG
100 TABLET ORAL 2 TIMES DAILY
Qty: 60 EACH | Refills: 0 | Status: SHIPPED | OUTPATIENT
Start: 2019-02-14

## 2019-02-14 RX ORDER — IBUPROFEN 200 MG
200 TABLET ORAL EVERY 6 HOURS PRN
Start: 2019-02-14

## 2019-02-14 RX ADMIN — ASPIRIN 325 MG: 325 TABLET, DELAYED RELEASE ORAL at 08:35

## 2019-02-14 RX ADMIN — SODIUM CHLORIDE 150 ML/HR: 9 INJECTION, SOLUTION INTRAVENOUS at 05:42

## 2019-02-14 RX ADMIN — ACETAMINOPHEN 650 MG: 325 TABLET ORAL at 00:23

## 2019-02-14 RX ADMIN — SODIUM CHLORIDE, PRESERVATIVE FREE 3 ML: 5 INJECTION INTRAVENOUS at 08:36

## 2019-02-14 RX ADMIN — ACETAMINOPHEN 650 MG: 325 TABLET ORAL at 05:42

## 2019-02-14 RX ADMIN — PANTOPRAZOLE SODIUM 40 MG: 40 TABLET, DELAYED RELEASE ORAL at 05:42

## 2019-02-14 NOTE — DISCHARGE SUMMARY
Patient Name: Susy Fitzpatrick  MRN: [id number]  : 1950  DOS: 2019    Attending: Esdras Ramires MD    Primary Care Provider: Blayne Kirby MD    Date of Admission:.2019  7:32 AM    Date of Discharge:  2019    Discharge Diagnosis:   Status post total replacement of right hip    Arthritis of right hip    GERD (gastroesophageal reflux disease)    Acute blood loss anemia    Acute postoperative pain    Orthostasis    Altered mental status episode, suspect syncope that's vasovagal or orthostatic    Dizziness    PONV      Hospital Course  Patient is a 68 y.o. female presented for right total hip arthroplasty by Dr. Ramires.     She underwent surgery under spinal anesthesia. She tolerated surgery well and was admitted for further medical management. Her hip has been painful for about a year. She has been using a cane or crutches for ambulation. She denies recent falls.    She did have episode POD1; she was ambulating to BR and complained of HA, dizzy and clamminess. She was found by nursing staff about 20 min later unresponsive. RRT was called, pt returned to baseline. CT head was negative. She was monitored on telemetry.    POD2 she continued to complain of dizziness and vomiting. MRI and EEG negative. She reported resolution of symptoms prior to discharge.    The patient has done well postop. The patient has been able to ambulate 200 feet with PT.    The patient has had good pain control with PO pain medications.  Adjustments were made to pain medications to optimize postop pain management. Risks and benefits of opiate medications discussed with patient.    The patient was placed on DVT prophylaxis including aspirin. The patient was encouraged to use IS for atelectasis prophylaxis.     The patient was placed on a bowel regimen to prevent constipation while on pain medication.   The patient's H/H was monitored with a slight decrease that remained asymptomatic.    It is felt by all involved that the  patient can discharge home at this time, and the patient has no further questions        Procedures Performed  2/11/2019     Pre-op Diagnosis:   Right hip OA       Post-Op Diagnosis Codes:  Right hip OA     Procedure/CPT® Codes:  21058     Procedure(s):  TOTAL RIGHT HIP ARTHROPLASTY ANTERIOR     Surgeon(s):  Esdras Ramires MD      Pertinent Test Results:    I reviewed the patient's new clinical results.   Results from last 7 days   Lab Units 02/13/19  0649 02/12/19  1410 02/12/19  0654 02/12/19  0550   WBC 10*3/mm3 5.45  --  7.99 7.15   HEMOGLOBIN g/dL 8.5* 9.3* 9.9* 9.8*   HEMATOCRIT % 25.9* 28.4* 30.2* 29.7*   PLATELETS 10*3/mm3 147*  --  160 172     Results from last 7 days   Lab Units 02/13/19  0649 02/12/19  0550   SODIUM mmol/L 139 136   POTASSIUM mmol/L 3.8 4.3   CHLORIDE mmol/L 110* 103   CO2 mmol/L 25.0 27.0   BUN mg/dL 14 14   CREATININE mg/dL 0.78 0.82   CALCIUM mg/dL 8.7 9.0   GLUCOSE mg/dL 104* 140*     Study Result     EXAM:    CT Head Without Contrast      EXAM DATE/TIME:    2/12/2019 6:55 AM      CLINICAL HISTORY:    68 years old, female; Other reduced mobility; Signs and symptoms; Altered   mental status/memory loss; Additional info: Confusion/delirium, altered loc,   unexplained      TECHNIQUE:    Axial computed tomography images of the head/brain without contrast.    All CT scans at this facility use at least one of these dose optimization   techniques: automated exposure control; mA and/or kV adjustment per patient   size (includes targeted exams where dose is matched to clinical indication); or   iterative reconstruction.      COMPARISON:    No relevant prior studies available.      FINDINGS:    Brain: Normal. No hemorrhage. No significant white matter disease. No edema.    Ventricles: Normal. No ventriculomegaly.    Bones/joints: No acute abnormality identified.  No acute fracture.    Sinuses: Visualized sinuses are unremarkable. No acute sinusitis.    Mastoid air cells: Visualized mastoid air  cells are unremarkable. No mastoid   effusion.    Soft tissues: Unremarkable.    Vasculature:  Atherosclerotic calcifications are present involving the carotid   artery siphons bilaterally.      IMPRESSION:  No acute intracranial abnormality identified.        Study Result     EXAMINATION: MRI BRAIN W WO CONTRAST-02/13/2019:     INDICATION: Dizziness, vomiting. Unresponsive episode yesterday;  Z74.09-Other reduced mobility; Z96.641-Presence of right artificial hip  joint; Z74.09-Other reduced mobility.      TECHNIQUE: Multiplanar MRI of the brain with and without intravenous  contrast administration.     COMPARISON: NONE.     FINDINGS: No restriction on diffusion-weighted sequences. Midline  structures are symmetric without evidence of mass, mass effect or  midline shift. Ventricles and sulci within normal limits. Pituitary and  sella within normal limits. Cervicomedullary junction demonstrates  cerebellar tonsils at the level of the foramen magnum consistent with  low-lying cerebellar tonsils and tonsillar ectopia without significant  crowding or descent.     Postcontrast administration sequences without abnormal enhancement  identified. Superior sagittal sinus widely patent. The visualized [place]  of [place] branches shows a vertebrobasilar system grossly unremarkable.  Globes and orbits retain normal T2 signal characteristics. The  visualized paranasal sinuses and mastoid air cells are grossly clear and  well pneumatized. No cerebellopontine angle mass lesion.     IMPRESSION:  No acute intracranial abnormality specifically, no evidence  for acute infarction or hydrocephalus. No abnormal enhancement or  advanced white matter disease process identified.     D:  02/13/2019  E:  02/13/2019     Study Result     EXAMINATION: XR ORBITS 4+ VW-      INDICATION: To clear for MRI; Z74.09-Other reduced mobility;  Z96.641-Presence of right artificial hip joint.     COMPARISON: None.     FINDINGS: Multiple views of the orbits  and facial bones with no evidence  for radiopaque density in the adjacent soft tissues to suggest metallic  foreign body. Visualized bones and paranasal sinuses are grossly clear  and unremarkable.         IMPRESSION:  Patient cleared for MRI as there is no radiopaque density to  suggest metallic foreign body.     D:  02/13/2019  E:  02/13/2019     EEG   Order: [id number]   Status:  Final result   Visible to patient:  No (Not Released)   Details     Reading Physician Reading Date Result Priority   Sukh Molina MD 2/13/2019 STAT      Narrative & Impression     Reason for referral:     68 y.o.female with spell of unresponsiveness, dizziness     Technical Summary:      A 19 channel digital EEG was performed using the international 10-20 placement system, including eye leads and EKG leads.     Findings:     The awake tracing them straight a poorly regulated 9 Hz posterior rhythm, with intermixed theta and faster frequencies seen anteriorly.  Intermittent slower 4-5 Hz delta and theta activity are seen over both hemispheres, and at times independently over the left temporal head leads (the latter finding may normal for age) sees.  Sleep is not seen.  No focal slowing or epileptiform activity is seen.  Photic stimulation yields a symmetric driving response at several flash frequencies.     IMPRESSION:     Mild intermittent generalized slow     No epileptiform activity is seen     This report is transcribed using the Dragon dictation system.  Despite the best of intentions, unexpected transcription errors may be present.                Last Resulted: 02/13/19 11:23             I reviewed the patient's new imaging including images and reports.      Physical therapy: Patient ambulated 200 feet with RW and step through gait pattern, limited by pain and fatigue. Patient climbed 1 step with RW with no difficulty. Patient has been d/c home with family and HHPT today.     Discharge Assessment:    Vital Signs  Visit  "Vitals  /64 (BP Location: Right arm, Patient Position: Sitting)   Pulse 74   Temp 98.1 °F (36.7 °C) (Oral)   Resp 18   Ht 157.5 cm (62\")   Wt 83.4 kg (183 lb 13.8 oz)   LMP 2001 Comment: mammogram- 2018    SpO2 97%   BMI 33.63 kg/m²     Temp (24hrs), Av.3 °F (36.8 °C), Min:98.1 °F (36.7 °C), Max:98.5 °F (36.9 °C)      General Appearance:    Alert, cooperative, in no acute distress   Lungs:     Clear to auscultation,respirations regular, even and                   unlabored    Heart:    Regular rhythm and normal rate, normal S1 and S2   Abdomen:     Normal bowel sounds, no masses, no organomegaly, soft        non-tender, non-distended, no guarding, no rebound                 tenderness   Extremities:   Moves all extremities well, no edema, no cyanosis, no              Redness. Right hip Prineo CDI   Pulses:   Pulses palpable and equal bilaterally   Skin:   No bleeding, bruising or rash   Neurologic:   Cranial nerves 2 - 12 grossly intact, sensation intact. Flexion and dorsiflexion intact bilateral feet.       Discharge Disposition: Home    Discharge Medications     Discharge Medications      New Medications      Instructions Start Date   aspirin 325 MG EC tablet   325 mg, Oral, Daily, For 1 month      docusate sodium 100 MG capsule   100 mg, Oral, 2 Times Daily      HYDROcodone-acetaminophen 5-325 MG per tablet  Commonly known as:  NORCO   1 tablet, Oral, Every 4 Hours PRN         Changes to Medications      Instructions Start Date   ibuprofen 200 MG tablet  Commonly known as:  ADVIL,MOTRIN  What changed:  additional instructions   200 mg, Oral, Every 6 Hours PRN, Must take an hour after aspirin if needed.         Continue These Medications      Instructions Start Date   esomeprazole 20 MG capsule  Commonly known as:  nexIUM   20 mg, Oral, Every Morning Before Breakfast             Discharge Diet: Regular diet    Activity at Discharge: WBAT RLE    Follow-up Appointments  Dr. Ramires per his " orders    Discharge took over 30 min.    JUAN Cuba  02/14/19  10:57 AM     I have personally performed the evaluation on this patient. My history is consistant  with above documentation . My exam finding are listed above. I have personally reviewed and discussed the above formulated discharge plan with patient, her sister  and Mari

## 2019-02-14 NOTE — PLAN OF CARE
Problem: Patient Care Overview  Goal: Plan of Care Review  Outcome: Outcome(s) achieved Date Met: 02/14/19 02/14/19 1106   Plan of Care Review   Progress improving   OTHER   Outcome Summary Patient ambulated 200 feet with RW and step through gait pattern, limited by pain and fatigue. Patient climbed 1 step with RW with no difficulty. Patient has been d/c home with family and HHPT today.    Coping/Psychosocial   Plan of Care Reviewed With patient;friend       Problem: Hip Arthroplasty (Total, Partial) (Adult)  Goal: Signs and Symptoms of Listed Potential Problems Will be Absent, Minimized or Managed (Hip Arthroplasty)  Outcome: Outcome(s) achieved Date Met: 02/14/19 02/14/19 1106   Goal/Outcome Evaluation   Problems Assessed (Hip Arthroplasty) functional deficit;pain;joint dislocation   Problems Present (Hip Arthroplasty) functional deficit;pain;joint dislocation

## 2019-02-14 NOTE — PROGRESS NOTES
"Susy Honeycutt [facility]  [id number]  1950    /64 (BP Location: Right arm, Patient Position: Sitting)   Pulse 74   Temp 98.1 °F (36.7 °C) (Oral)   Resp 18   Ht 157.5 cm (62\")   Wt 83.4 kg (183 lb 13.8 oz)   LMP 03/20/2001 Comment: mammogram- 2018   SpO2 97%   BMI 33.63 kg/m²     Lab Results (last 24 hours)     Procedure Component Value Units Date/Time    CBC (No Diff) [[id number]]  (Abnormal) Collected:  02/13/19 0649    Specimen:  Blood Updated:  02/13/19 1027     WBC 5.45 10*3/mm3      RBC 2.92 10*6/mm3      Hemoglobin 8.5 g/dL      Hematocrit 25.9 %      MCV 88.7 fL      MCH 29.1 pg      MCHC 32.8 g/dL      RDW 14.0 %      RDW-SD 45.8 fl      MPV 9.5 fL      Platelets 147 10*3/mm3     Basic Metabolic Panel [[id number]]  (Abnormal) Collected:  02/13/19 0649    Specimen:  Blood Updated:  02/13/19 1020     Glucose 104 mg/dL      BUN 14 mg/dL      Creatinine 0.78 mg/dL      Sodium 139 mmol/L      Potassium 3.8 mmol/L      Chloride 110 mmol/L      CO2 25.0 mmol/L      Calcium 8.7 mg/dL      eGFR Non African Amer 74 mL/min/1.73      BUN/Creatinine Ratio 17.9     Anion Gap 4.0 mmol/L     Narrative:       [employer] Guidelines    Stage     Description        GFR  1         Normal or High     90+  2         Mild decrease      60-89  3         Moderate decrease  30-59  4         Severe decrease    15-29  5         Kidney failure     <15    The MDRD GFR formula is only valid for adults with stable renal function between ages 18 and 70.          Patient Care Team:  Blayne Kirby MD as PCP - General (Family Medicine)  Blayne Kirby MD as PCP - Claims Attributed    SUBJECTIVE  Pain well controlled.  Excellent progress in physical therapy.    PHYSICAL EXAM  Incision benign  Minimal thigh swelling  Neurovascularly intact to knees and toes       Status post total replacement of right hip    Arthritis of right hip    GERD (gastroesophageal reflux disease)    Acute blood loss anemia    Acute " postoperative pain      PLAN / DISPOSITION:  Hemoglobin 8.5 today - no transfusion anticipated  Discharge home today    Esdras Ramires MD  02/14/19  8:54 AM

## 2019-02-14 NOTE — THERAPY DISCHARGE NOTE
Acute Care - Physical Therapy Treatment Note/Discharge   Prasanth     Patient Name: Susy Fitzpatrick  : 1950  MRN: [id number]  Today's Date: 2019  Onset of Illness/Injury or Date of Surgery: 19  Date of Referral to PT: 19  Referring Physician: MD Ike    Admit Date: 2019    Visit Dx:    ICD-10-CM ICD-9-CM   1. Impaired functional mobility, balance, gait, and endurance Z74.09 V49.89   2. Status post total replacement of right hip Z96.641 V43.64   3. 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)   • Arthritis of right hip   • GERD (gastroesophageal reflux disease)   • Status post total replacement of right hip   • Acute blood loss anemia   • Acute postoperative pain       Physical Therapy Education     Title: PT OT SLP Therapies (In Progress)     Topic: Physical Therapy (Done)     Point: Mobility training (Done)     Learning Progress Summary           Patient Acceptance, E,D,H, VU,DU by LR at 2019 11:06 AM    Comment:  Issued and reviewed written/illustrated HEP. Educated on precautions, weight bearing status, correct supine<->sit t/f technique, correct sit<->stand t/f technique, correct gait mechanics, correct stair training technique, and correct car t/f technique.    Acceptance, E,D, VU,NR by LR at 2019  3:14 PM    Comment:  Educated on HEP, correct supine <-> sit t/f technique, correct sit<->stand t/f technique, correct gait mechanics, and progression of POC.    Acceptance, E,D, VU,NR by LR at 2019 11:41 AM    Comment:  Educated on hip precautions, weight bearing status, correct supine to sit t/f technique, correct sit<->stand t/f technique, correct gait mechanics, benefits of mobility, and progression of POC.    Acceptance, E,D, VU,NR by LR at 2019  2:46 PM    Comment:  Educated on hip precautions, weight bearing status, correct supine to sit t/f  technique, correct sit<->stand t/f technique, correct gait mechanics, and progression of POC.   Family Acceptance, E,D,H, VU,DU by LR at 2/14/2019 11:06 AM    Comment:  Issued and reviewed written/illustrated HEP. Educated on precautions, weight bearing status, correct supine<->sit t/f technique, correct sit<->stand t/f technique, correct gait mechanics, correct stair training technique, and correct car t/f technique.    Acceptance, E,D, VU,NR by LR at 2/12/2019  3:14 PM    Comment:  Educated on HEP, correct supine <-> sit t/f technique, correct sit<->stand t/f technique, correct gait mechanics, and progression of POC.    Acceptance, E,D, VU,NR by LR at 2/12/2019 11:41 AM    Comment:  Educated on hip precautions, weight bearing status, correct supine to sit t/f technique, correct sit<->stand t/f technique, correct gait mechanics, benefits of mobility, and progression of POC.    Acceptance, E,D, VU,NR by LR at 2/11/2019  2:46 PM    Comment:  Educated on hip precautions, weight bearing status, correct supine to sit t/f technique, correct sit<->stand t/f technique, correct gait mechanics, and progression of POC.                   Point: Home exercise program (Done)     Learning Progress Summary           Patient Acceptance, E,D,H, VU,DU by LR at 2/14/2019 11:06 AM    Comment:  Issued and reviewed written/illustrated HEP. Educated on precautions, weight bearing status, correct supine<->sit t/f technique, correct sit<->stand t/f technique, correct gait mechanics, correct stair training technique, and correct car t/f technique.    Acceptance, E,D, VU,NR by LR at 2/12/2019  3:14 PM    Comment:  Educated on HEP, correct supine <-> sit t/f technique, correct sit<->stand t/f technique, correct gait mechanics, and progression of POC.    Acceptance, E,D, VU,NR by LR at 2/12/2019 11:41 AM    Comment:  Educated on hip precautions, weight bearing status, correct supine to sit t/f technique, correct sit<->stand t/f technique,  correct gait mechanics, benefits of mobility, and progression of POC.    Acceptance, E,D, VU,NR by LR at 2/11/2019  2:46 PM    Comment:  Educated on hip precautions, weight bearing status, correct supine to sit t/f technique, correct sit<->stand t/f technique, correct gait mechanics, and progression of POC.   Family Acceptance, E,D,H, VU,DU by LR at 2/14/2019 11:06 AM    Comment:  Issued and reviewed written/illustrated HEP. Educated on precautions, weight bearing status, correct supine<->sit t/f technique, correct sit<->stand t/f technique, correct gait mechanics, correct stair training technique, and correct car t/f technique.    Acceptance, E,D, VU,NR by LR at 2/12/2019  3:14 PM    Comment:  Educated on HEP, correct supine <-> sit t/f technique, correct sit<->stand t/f technique, correct gait mechanics, and progression of POC.    Acceptance, E,D, VU,NR by LR at 2/12/2019 11:41 AM    Comment:  Educated on hip precautions, weight bearing status, correct supine to sit t/f technique, correct sit<->stand t/f technique, correct gait mechanics, benefits of mobility, and progression of POC.    Acceptance, E,D, VU,NR by LR at 2/11/2019  2:46 PM    Comment:  Educated on hip precautions, weight bearing status, correct supine to sit t/f technique, correct sit<->stand t/f technique, correct gait mechanics, and progression of POC.                   Point: Body mechanics (Done)     Learning Progress Summary           Patient Acceptance, E,D,H, VU,DU by LR at 2/14/2019 11:06 AM    Comment:  Issued and reviewed written/illustrated HEP. Educated on precautions, weight bearing status, correct supine<->sit t/f technique, correct sit<->stand t/f technique, correct gait mechanics, correct stair training technique, and correct car t/f technique.    Acceptance, E,D, VU,NR by LR at 2/12/2019  3:14 PM    Comment:  Educated on HEP, correct supine <-> sit t/f technique, correct sit<->stand t/f technique, correct gait mechanics, and  progression of POC.    Acceptance, E,D, VU,NR by LR at 2/12/2019 11:41 AM    Comment:  Educated on hip precautions, weight bearing status, correct supine to sit t/f technique, correct sit<->stand t/f technique, correct gait mechanics, benefits of mobility, and progression of POC.    Acceptance, E,D, VU,NR by LR at 2/11/2019  2:46 PM    Comment:  Educated on hip precautions, weight bearing status, correct supine to sit t/f technique, correct sit<->stand t/f technique, correct gait mechanics, and progression of POC.   Family Acceptance, E,D,H, VU,DU by LR at 2/14/2019 11:06 AM    Comment:  Issued and reviewed written/illustrated HEP. Educated on precautions, weight bearing status, correct supine<->sit t/f technique, correct sit<->stand t/f technique, correct gait mechanics, correct stair training technique, and correct car t/f technique.    Acceptance, E,D, VU,NR by LR at 2/12/2019  3:14 PM    Comment:  Educated on HEP, correct supine <-> sit t/f technique, correct sit<->stand t/f technique, correct gait mechanics, and progression of POC.    Acceptance, E,D, VU,NR by LR at 2/12/2019 11:41 AM    Comment:  Educated on hip precautions, weight bearing status, correct supine to sit t/f technique, correct sit<->stand t/f technique, correct gait mechanics, benefits of mobility, and progression of POC.    Acceptance, E,D, VU,NR by LR at 2/11/2019  2:46 PM    Comment:  Educated on hip precautions, weight bearing status, correct supine to sit t/f technique, correct sit<->stand t/f technique, correct gait mechanics, and progression of POC.                   Point: Precautions (Done)     Learning Progress Summary           Patient Acceptance, E,D,H, VU,DU by LR at 2/14/2019 11:06 AM    Comment:  Issued and reviewed written/illustrated HEP. Educated on precautions, weight bearing status, correct supine<->sit t/f technique, correct sit<->stand t/f technique, correct gait mechanics, correct stair training technique, and correct car  t/f technique.    Acceptance, E,D, VU,NR by LR at 2/12/2019  3:14 PM    Comment:  Educated on HEP, correct supine <-> sit t/f technique, correct sit<->stand t/f technique, correct gait mechanics, and progression of POC.    Acceptance, E,D, VU,NR by LR at 2/12/2019 11:41 AM    Comment:  Educated on hip precautions, weight bearing status, correct supine to sit t/f technique, correct sit<->stand t/f technique, correct gait mechanics, benefits of mobility, and progression of POC.    Acceptance, E,D, VU,NR by LR at 2/11/2019  2:46 PM    Comment:  Educated on hip precautions, weight bearing status, correct supine to sit t/f technique, correct sit<->stand t/f technique, correct gait mechanics, and progression of POC.   Family Acceptance, E,D,H, VU,DU by LR at 2/14/2019 11:06 AM    Comment:  Issued and reviewed written/illustrated HEP. Educated on precautions, weight bearing status, correct supine<->sit t/f technique, correct sit<->stand t/f technique, correct gait mechanics, correct stair training technique, and correct car t/f technique.    Acceptance, E,D, VU,NR by LR at 2/12/2019  3:14 PM    Comment:  Educated on HEP, correct supine <-> sit t/f technique, correct sit<->stand t/f technique, correct gait mechanics, and progression of POC.    Acceptance, E,D, VU,NR by LR at 2/12/2019 11:41 AM    Comment:  Educated on hip precautions, weight bearing status, correct supine to sit t/f technique, correct sit<->stand t/f technique, correct gait mechanics, benefits of mobility, and progression of POC.    Acceptance, E,D, VU,NR by LR at 2/11/2019  2:46 PM    Comment:  Educated on hip precautions, weight bearing status, correct supine to sit t/f technique, correct sit<->stand t/f technique, correct gait mechanics, and progression of POC.                               User Key     Initials Effective Dates Name Provider Type Discipline    LR 06/19/15 -  Tiesha Fernandez, PT Physical Therapist PT              Rehab Goal Summary      Row Name 02/14/19 1106             Physical Therapy Goals    Bed Mobility Goal Selection (PT)  bed mobility, PT goal 1  -LR      Transfer Goal Selection (PT)  transfer, PT goal 1  -LR      Gait Training Goal Selection (PT)  gait training, PT goal 1  -LR      Stairs Goal Selection (PT)  stairs, PT goal 1  -LR         Bed Mobility Goal 1 (PT)    Activity/Assistive Device (Bed Mobility Goal 1, PT)  sit to supine/supine to sit  -LR      Home Level/Cues Needed (Bed Mobility Goal 1, PT)  conditional independence  -LR      Time Frame (Bed Mobility Goal 1, PT)  long term goal (LTG);3 days  -LR      Progress/Outcomes (Bed Mobility Goal 1, PT)  goal not met;discharged from facility  -LR         Transfer Goal 1 (PT)    Activity/Assistive Device (Transfer Goal 1, PT)  sit-to-stand/stand-to-sit;walker, rolling  -LR      Home Level/Cues Needed (Transfer Goal 1, PT)  conditional independence  -LR      Time Frame (Transfer Goal 1, PT)  long term goal (LTG);3 days  -LR      Progress/Outcome (Transfer Goal 1, PT)  goal not met;discharged from facility  -LR         Gait Training Goal 1 (PT)    Activity/Assistive Device (Gait Training Goal 1, PT)  gait (walking locomotion);walker, rolling  -LR      Home Level (Gait Training Goal 1, PT)  conditional independence  -LR      Distance (Gait Goal 1, PT)  500 feet  -LR      Time Frame (Gait Training Goal 1, PT)  long term goal (LTG);3 days  -LR      Progress/Outcome (Gait Training Goal 1, PT)  goal not met;discharged from facility  -LR         Stairs Goal 1 (PT)    Activity/Assistive Device (Stairs Goal 1, PT)  ascending stairs;descending stairs;step-to-step;walker, rolling;other (see comments) backwards  -LR      Home Level/Cues Needed (Stairs Goal 1, PT)  contact guard assist  -LR      Number of Stairs (Stairs Goal 1, PT)  1  -LR      Time Frame (Stairs Goal 1, PT)  long term goal (LTG);3 days  -LR      Progress/Outcome (Stairs Goal 1, PT)  goal met  -LR         User Key  (r) = Recorded By, (t) = Taken By, (c) = Cosigned By    Initials Name Provider Type Discipline    LR Tiesha Fernandez, PT Physical Therapist PT        Therapy Treatment  Rehabilitation Treatment Summary     Row Name 02/14/19 1106             Treatment Time/Intention    Discipline  physical therapist  -LR      Document Type  therapy note (daily note);discharge evaluation/summary  -LR      Subjective Information  no complaints  -LR      Mode of Treatment  physical therapy;individual therapy  -LR      Patient/Family Observations  Patient supine in bed upon arrival with friend present.   -LR      Care Plan Review  care plan/treatment goals reviewed;risks/benefits reviewed;current/potential barriers reviewed;patient/other agree to care plan  -LR      Care Plan Review, Other Participant(s)  friend  -LR      Patient Effort  excellent  -LR      Existing Precautions/Restrictions  fall;right;hip, anterior  -LR      Recorded by [LR] Tiesha Fernandez, PT 02/14/19 1302      Row Name 02/14/19 1106             Cognitive Assessment/Intervention- PT/OT    Orientation Status (Cognition)  oriented x 4  -LR      Follows Commands (Cognition)  WFL;follows one step commands;over 90% accuracy;verbal cues/prompting required;repetition of directions required  -LR      Safety Deficit (Cognitive)  safety precautions awareness;safety precautions follow-through/compliance  -LR      Recorded by [LR] Tiesha Fernandez, PT 02/14/19 1302      Row Name 02/14/19 1106             Safety Issues, Functional Mobility    Safety Issues Affecting Function (Mobility)  safety precaution awareness;safety precautions follow-through/compliance;sequencing abilities  -LR      Recorded by [LR] Tiesha Fernandez, PT 02/14/19 1302      Row Name 02/14/19 1106             Mobility Assessment/Intervention    Extremity Weight-bearing Status  right lower extremity  -LR      Right Lower Extremity (Weight-bearing Status)  weight-bearing  as tolerated (WBAT)  -LR      Recorded by [LR] Tiesha Fernandez, PT 02/14/19 1302      Row Name 02/14/19 1106             Bed Mobility Assessment/Treatment    Supine-Sit [place] (Bed Mobility)  verbal cues;minimum assist (75% patient effort)  -LR      Sit-Supine [place] (Bed Mobility)  verbal cues;minimum assist (75% patient effort)  -LR      Bed Mobility, Safety Issues  decreased use of legs for bridging/pushing  -LR      Assistive Device (Bed Mobility)  head of bed elevated;bed rails  -LR      Comment (Bed Mobility)  Verbal cues to move LEs towards EOB and to push up from bed to raise trunk into sitting and to scoot hips out to get feet on floor. Denied dizziness upon sitting up. Verbal cues to push from bed to scoot hips back and up into bed, min assist to lift R LE up into bed. Min assist only to R LE.   -LR      Recorded by [LR] Tiesha Fernandez, PT 02/14/19 1302      Row Name 02/14/19 1106             Transfer Assessment/Treatment    Transfer Assessment/Treatment  sit-stand transfer;stand-sit transfer  -LR      Comment (Transfers)  Verbal cues to push up from bed to stand and to reach back for bed to lower into sitting. Verbal cues to step R LE out before t/f for comfort.   -LR      Recorded by [LR] Tiesha Fernandez, PT 02/14/19 1302      Row Name 02/14/19 1106             Sit-Stand Transfer    Sit-Stand [place] (Transfers)  verbal cues;stand by assist  -LR      Assistive Device (Sit-Stand Transfers)  walker, front-wheeled  -LR      Recorded by [LR] Tiesha Fernandez, PT 02/14/19 1302      Row Name 02/14/19 1106             Stand-Sit Transfer    Stand-Sit [place] (Transfers)  verbal cues;stand by assist  -LR      Assistive Device (Stand-Sit Transfers)  walker, front-wheeled  -LR      Recorded by [LR] Tiesha Fernandez, PT 02/14/19 1302      Row Name 02/14/19 1106             Gait/Stairs Assessment/Training    41581 - Gait Training Minutes   7  -LR       [place] Level (Gait)  verbal cues;contact guard  -LR      Assistive Device (Gait)  walker, front-wheeled  -LR      Distance in Feet (Gait)  200  -LR      Pattern (Gait)  step-through  -LR      Deviations/Abnormal Patterns (Gait)  right sided deviations;antalgic;bilateral deviations;kari decreased;gait speed decreased;stride length decreased  -LR      Bilateral Gait Deviations  forward flexed posture;heel strike decreased  -LR      Right Sided Gait Deviations  weight shift ability decreased  -LR      Negotiation (Stairs)  stairs independence;stairs assistive device;handrail location;number of steps;ascending technique;descending technique  -LR      [place] Level (Stairs)  verbal cues;contact guard  -LR      Assistive Device (Stairs)  walker, front-wheeled  -LR      Handrail Location (Stairs)  none  -LR      Number of Steps (Stairs)  1  -LR      Ascending Technique (Stairs)  step-to-step  -LR      Descending Technique (Stairs)  step-to-step  -LR      Stairs, Safety Issues  sequencing ability decreased;weight-shifting ability decreased  -LR      Stairs, Impairments  ROM decreased;strength decreased;pain  -LR      Comment (Gait/Stairs)  Patient ambulated with step through gait pattern at slow pace. Verbal cues for increased R LE weight bearing/stance phase, decreased UE weight bearing, and upright posture. Improved with cues for correction. Gait limited by pain. Verbal cues to back up to step with RW and to use RW for UE support. Verbal cues to step up backwards with strong LE first and down with weak LE first. No LOB or unsteadiness with step.   -LR      Recorded by [LR] Tiesha Fernandez, PT 02/14/19 1302      Row Name 02/14/19 1106             Therapeutic Exercise    37051 - PT Therapeutic Exercise Minutes  10  -LR      Recorded by [LR] Tiesha Fernandez, PT 02/14/19 1302      Row Name 02/14/19 1106             Therapeutic Exercise    Lower Extremity (Therapeutic Exercise)  gluteal sets;heel  slides, right;LAQ (long arc quad), right;marching while standing;quad sets, right;SLR (straight leg raise), right;other (see comments) standing calf raises, hip extension,mini squats,   -LR      Lower Extremity Range of Motion (Therapeutic Exercise)  hip abduction/adduction, right;ankle dorsiflexion/plantar flexion, right  -LR      Exercise Type (Therapeutic Exercise)  AROM (active range of motion);AAROM (active assistive range of motion);isometric contraction, static;isotonic contraction, concentric  -LR      Position (Therapeutic Exercise)  seated;supine;standing  -LR      Sets/Reps (Therapeutic Exercise)  x15 reps each  -LR      Comment (Therapeutic Exercise)  cues for technique; min assist SLR, heel slides, hip abd  -LR      Recorded by [LR] Tiesha Fernandez, PT 02/14/19 1302      Row Name 02/14/19 1106             Positioning and Restraints    Pre-Treatment Position  in bed  -LR      Post Treatment Position  bed  -LR      In Bed  notified nsg;supine;call light within reach;encouraged to call for assist;with family/caregiver;side rails up x2  -LR      Recorded by [LR] Tiesha Fernandez, PT 02/14/19 1302      Row Name 02/14/19 1106             Pain Assessment    Additional Documentation  Pain Scale: Numbers Pre/Post-Treatment (Group)  -LR      Recorded by [LR] Tiesha Fernandez, PT 02/14/19 1302      Row Name 02/14/19 1106             Pain Scale: Numbers Pre/Post-Treatment    Pain Scale: Numbers, Pretreatment  0/10 - no pain  -LR      Pain Scale: Numbers, Post-Treatment  1/10  -LR      Pain Location - Side  Right  -LR      Pain Location  hip  -LR      Pain Intervention(s)  Repositioned;Ambulation/increased activity  -LR      Recorded by [LR] Tiesha Fernandez, PT 02/14/19 1302      Row Name                Wound 02/11/19 1028 Right hip incision    Wound - Properties Group Date first assessed: 02/11/19 [RV] Time first assessed: 1028 [RV] Side: Right [RV] Location: hip [RV] Type: incision [RV]  Recorded by:  [RV] Abram Zheng RN 02/11/19 1028    Row Name 02/14/19 1106             Coping    Observed Emotional State  accepting;cooperative  -LR      Verbalized Emotional State  acceptance  -LR      Recorded by [LR] Tiesha Fernandez, PT 02/14/19 1302      Row Name 02/14/19 1106             Plan of Care Review    Plan of Care Reviewed With  patient;friend  -LR      Recorded by [LR] Tiesha Fernandez, PT 02/14/19 1302      Row Name 02/14/19 1106             Outcome Summary/Treatment Plan (PT)    Daily Summary of Progress (PT)  progress toward functional goals as expected  -LR      Recorded by [LR] Tiesha Fernandez, PT 02/14/19 1302        User Key  (r) = Recorded By, (t) = Taken By, (c) = Cosigned By    Initials Name Effective Dates Discipline    LR Tiesha Fernandez, PT 06/19/15 -  PT    RV Abram Zheng RN 06/16/16 -  Nurse        Wound 02/11/19 1028 Right hip incision (Active)   Dressing Appearance dry;intact 2/14/2019  8:35 AM   Closure Liquid skin adhesive 2/14/2019  8:35 AM   Drainage Amount none 2/14/2019  8:35 AM       PT Recommendation and Plan  Anticipated Discharge Disposition (PT): home with assist, home with home health  Planned Therapy Interventions (PT Eval): balance training, bed mobility training, gait training, home exercise program, patient/family education, ROM (range of motion), stair training, strengthening, transfer training  Therapy Frequency (PT Clinical Impression): 2 times/day  Outcome Summary/Treatment Plan (PT)  Daily Summary of Progress (PT): progress toward functional goals as expected  Anticipated Equipment Needs at Discharge (PT): front wheeled walker, bedside commode  Anticipated Discharge Disposition (PT): home with assist, home with home health  Plan of Care Reviewed With: patient, friend  Progress: improving  Outcome Summary: Patient ambulated 200 feet with RW and step through gait pattern, limited by pain and fatigue. Patient climbed 1  step with RW with no difficulty. Patient has been d/c home with family and HHPT today.     Outcome Measures     Row Name 02/14/19 1106 02/12/19 1514 02/12/19 1413       How much help from another person do you currently need...    Turning from your back to your side while in flat bed without using bedrails?  3  -LR  3  -LR  --    Moving from lying on back to sitting on the side of a flat bed without bedrails?  3  -LR  3  -LR  --    Moving to and from a bed to a chair (including a wheelchair)?  3  -LR  3  -LR  --    Standing up from a chair using your arms (e.g., wheelchair, bedside chair)?  3  -LR  3  -LR  --    Climbing 3-5 steps with a railing?  3  -LR  3  -LR  --    To walk in hospital room?  3  -LR  3  -LR  --    AM-PAC 6 Clicks Score  18  -LR  18  -LR  --       How much help from another is currently needed...    Putting on and taking off regular lower body clothing?  --  --  3 with AE use  -MILLIE    Bathing (including washing, rinsing, and drying)  --  --  3 AE/AD use  -MILLIE    Toileting (which includes using toilet bed pan or urinal)  --  --  4  -MILLIE    Putting on and taking off regular upper body clothing  --  --  4  -MILLIE    Taking care of personal grooming (such as brushing teeth)  --  --  3  -MILLIE    Eating meals  --  --  4  -MILLIE    Score  --  --  21  -MILLIE       Functional Assessment    Outcome Measure Options  AM-PAC 6 Clicks Basic Mobility (PT)  -LR  AM-PAC 6 Clicks Basic Mobility (PT)  -LR  AM-PAC 6 Clicks Daily Activity (OT)  -MILLIE    Row Name 02/12/19 1141 02/11/19 1446          How much help from another person do you currently need...    Turning from your back to your side while in flat bed without using bedrails?  3  -LR  3  -LR     Moving from lying on back to sitting on the side of a flat bed without bedrails?  3  -LR  3  -LR     Moving to and from a bed to a chair (including a wheelchair)?  3  -LR  3  -LR     Standing up from a chair using your arms (e.g., wheelchair, bedside chair)?  3  -LR  3  -LR      Climbing 3-5 steps with a railing?  3  -LR  3  -LR     To walk in hospital room?  3  -LR  3  -LR     AM-PAC 6 Clicks Score  18  -LR  18  -LR        Functional Assessment    Outcome Measure Options  AM-PAC 6 Clicks Basic Mobility (PT)  -LR  AM-PAC 6 Clicks Basic Mobility (PT)  -LR       User Key  (r) = Recorded By, (t) = Taken By, (c) = Cosigned By    Initials Name Provider Type    Felicitas Pedro, OT Occupational Therapist    LR Tiesha Fernandez, PT Physical Therapist           Time Calculation:   PT Charges     Row Name 02/14/19 1106             Time Calculation    Start Time  1106  -LR      PT Received On  02/14/19  -LR      PT Goal Re-Cert Due Date  02/21/19  -LR         Time Calculation- PT    Total Timed Code Minutes- PT  17 minute(s)  -LR         Timed Charges    20920 - PT Therapeutic Exercise Minutes  10  -LR      09772 - Gait Training Minutes   7  -LR        User Key  (r) = Recorded By, (t) = Taken By, (c) = Cosigned By    Initials Name Provider Type    LR Tiesha Fernandez, PT Physical Therapist        Therapy Suggested Charges     Code   Minutes Charges    91489 (CPT®) Hc Pt Neuromusc Re Education Ea 15 Min      65278 (CPT®) Hc Pt Ther Proc Ea 15 Min 10 1    71130 (CPT®) Hc Gait Training Ea 15 Min 7     30827 (CPT®) Hc Pt Therapeutic Act Ea 15 Min      87633 (CPT®) Hc Pt Manual Therapy Ea 15 Min      87971 (CPT®) Hc Pt Iontophoresis Ea 15 Min      53962 (CPT®) Hc Pt Elec Stim Ea-Per 15 Min      90948 (CPT®) Hc Pt Ultrasound Ea 15 Min      25059 (CPT®) Hc Pt Self Care/Mgmt/Train Ea 15 Min      16154 (CPT®) Hc Pt Prosthetic (S) Train Initial Encounter, Each 15 Min      85779 (CPT®) Hc Pt Orthotic(S)/Prosthetic(S) Encounter, Each 15 Min      41315 (CPT®) Hc Orthotic(S) Mgmt/Train Initial Encounter, Each 15min      Total  17 1          Therapy Charges for Today     Code Description Service Date Service Provider Modifiers Qty    27581874623 HC PT THER PROC EA 15 MIN 2/14/2019 Tiesha Fernandez  Chanda, PT GP 1          PT G-Codes  Outcome Measure Options: AM-PAC 6 Clicks Basic Mobility (PT)  AM-PAC 6 Clicks Score: 18  Score: 21    PT Discharge Summary  Anticipated Discharge Disposition (PT): home with assist, home with home health  Reason for Discharge: Discharge from facility  Outcomes Achieved: Patient able to partially acheive established goals  Discharge Destination: Home with assist, Home with home health    Tiesha Fernandez, PT  2/14/2019

## 2019-02-15 ENCOUNTER — READMISSION MANAGEMENT (OUTPATIENT)
Dept: CALL CENTER | Facility: HOSPITAL | Age: 69
End: 2019-02-15

## 2019-02-15 LAB
ABO + RH BLD: NORMAL
ABO + RH BLD: NORMAL
BH BB BLOOD EXPIRATION DATE: NORMAL
BH BB BLOOD EXPIRATION DATE: NORMAL
BH BB BLOOD TYPE BARCODE: 5100
BH BB BLOOD TYPE BARCODE: 5100
BH BB DISPENSE STATUS: NORMAL
BH BB DISPENSE STATUS: NORMAL
BH BB PRODUCT CODE: NORMAL
BH BB PRODUCT CODE: NORMAL
BH BB UNIT NUMBER: NORMAL
BH BB UNIT NUMBER: NORMAL
UNIT  ABO: NORMAL
UNIT  ABO: NORMAL
UNIT  RH: NORMAL
UNIT  RH: NORMAL

## 2019-02-16 NOTE — OUTREACH NOTE
Prep Survey      Responses   Facility patient discharged from?  [facility]   Is patient eligible?  Yes   Discharge diagnosis  Status post total replacement of right hip   Does the patient have one of the following disease processes/diagnoses(primary or secondary)?  Total Joint Replacement   Does the patient have Home health ordered?  Yes   What is the Home health agency?    [facility]   Is there a DME ordered?  Yes   What DME was ordered?  [facility]    Commode Chair/Walker   Prep survey completed?  Yes          Lara Doyle RN

## 2019-02-18 ENCOUNTER — READMISSION MANAGEMENT (OUTPATIENT)
Dept: CALL CENTER | Facility: HOSPITAL | Age: 69
End: 2019-02-18

## 2019-02-18 NOTE — OUTREACH NOTE
Total Joint Week 1 Survey      Responses   Facility patient discharged from?  [facility]   Does the patient have one of the following disease processes/diagnoses(primary or secondary)?  Total Joint Replacement   Is there a successful TCM telephone encounter documented?  No   Joint surgery performed?  Hip   Week 1 attempt successful?  Yes   Call start time  1257   Call end time  1312   Has the patient been back in either the hospital or Emergency Department since discharge?  No   Discharge diagnosis  Status post total replacement of right hip   Is patient permission given to speak with other caregiver?  Yes   List who call center can speak with  daughter   Does the patient have all medications related to this admission filled (includes all antibiotics, pain medications, etc.)  Yes   Prescription comments  stopped taking pain meds,  is taking ibuprofen for pain   Is the patient taking all medications as directed (includes completed medication regime)?  Yes   Is the patient able to teach back alternate methods of pain control?  Ice, Reposition, Correct alignment, Short, frequent activity   Does the patient have a follow up appointment with their surgeon?  Yes   Has the patient kept scheduled appointments due by today?  N/A   What is the Home health agency?    [facility]   Has home health visited the patient within 72 hours of discharge?  Yes   What DME was ordered?  [facility]    Commode Chair/Walker   Has all DME been delivered?  Yes   Psychosocial issues?  No   Has the patient began therapy sessions (either in the home or as an out patient)?  Yes   If the patient has started attending therapy, what post op day did they begin to attend (either in home or as an out patient)?    2/16 POD 5 at home,  had in the hospital though   Does the patient have a wound vac in place?  No   Has the patient fallen since discharge?  No   Did the patient receive a copy of their discharge instructions?  Yes   Nursing interventions  Reviewed  instructions with patient   What is the patient's perception of their functional status since discharge?  Improving   Is the patient able to teach back signs and symptoms of infection?  Temp >100.4 for 24h or longer, Incisional drainage, Blisters around incision, Increased swelling or redness around incision (not associated with surgical edema), Severe discomfort or pain, Changes in mobility, Shortness of breath or chest pain   Is the patient able to teach back how to prevent infection?  Check incision daily, Keep incision covered if drainage, Wash hands before and after touching incision, Shower only as directed by surgeon, No lotion or creams, No tub baths, hot tub or swimming, Eat well-balanced diet   Is the patient able to teach back signs and symptoms of DVT?  Redness in calf, Shortness of breath or chest pain, Area hot to touch, Swelling in calf, Severe pain in calf   Is the patient able to teach back home safety measures?  Ability to shower, Accessibility to necessary areas in home, Modifications to reach items   Did the patient implement home safety suggestions from pre-surgery classes if attended?  Yes   Is the patient/caregiver able to teach back the hierarchy of who to call/visit for symptoms/problems? PCP, Specialist, Home health nurse, Urgent Care, ED, 911  Yes   Additional teach back comments  States the class pre-surgery helped her prepare a lot. States she thinks its an excellent idea.    Week 1 call completed?  Yes          Shy Baldwin RN

## 2019-02-25 ENCOUNTER — READMISSION MANAGEMENT (OUTPATIENT)
Dept: CALL CENTER | Facility: HOSPITAL | Age: 69
End: 2019-02-25

## 2019-02-25 NOTE — OUTREACH NOTE
Total Joint Week 2 Survey      Responses   Facility patient discharged from?  [facility]   Does the patient have one of the following disease processes/diagnoses(primary or secondary)?  Total Joint Replacement   Joint surgery performed?  Hip   Week 2 attempt successful?  Yes   Call start time  1557   Call end time  1559   Has the patient been back in either the hospital or Emergency Department since discharge?  No   Is the patient taking all medications as directed (includes completed medication regime)?  Yes   Is the patient able to teach back alternate methods of pain control?  Ice, Reposition, Correct alignment, Short, frequent activity   Has the patient kept scheduled appointments due by today?  N/A   What is the Home health agency?    [facility]   Has the patient began therapy sessions (either in the home or as an out patient)?  Yes   Has the patient fallen since discharge?  No   What is the patient's perception of their functional status since discharge?  Improving   Week 2 call completed?  Yes          Nay Lagos RN

## 2019-03-12 ENCOUNTER — READMISSION MANAGEMENT (OUTPATIENT)
Dept: CALL CENTER | Facility: HOSPITAL | Age: 69
End: 2019-03-12

## 2019-03-12 NOTE — OUTREACH NOTE
Total Joint Month 1 Survey      Responses   Facility patient discharged from?  [facility]   Does the patient have one of the following disease processes/diagnoses(primary or secondary)?  Total Joint Replacement   Joint surgery performed?  Hip   Month 1 attempt successful?  Yes   Call start time  1431   Call end time  1434   Has the patient been back in either the hospital or Emergency Department since discharge?  No   Discharge diagnosis  Status post total replacement of right hip   Prescription comments  stopped taking pain meds,  is taking ibuprofen for pain   Is the patient taking all medications as directed (includes completed medication regime)?  Yes   Is the patient able to teach back alternate methods of pain control?  Ice, Reposition, Correct alignment, Short, frequent activity   Has the patient kept scheduled appointments due by today?  Yes   Comments  saw Dr. Ramires 3/1   Is the patient still receiving Home Health Services?  Yes   Home health comments  this is the last week    Is the patient still attending therapy sessions(either in the home or as an outpatient)?  Yes   Has the patient fallen since discharge?  No   What is the patient's perception of their functional status since discharge?  Improving   If the patient has signs and symptoms of infection, list patient actions taken  incision healed   Is the patient/caregiver able to teach back the hierarchy of who to call/visit for symptoms/problems? PCP, Specialist, Home health nurse, Urgent Care, ED, 911  Yes   Month 1 call completed?  Yes   Revoked  No further contact(revokes)-requires comment   Graduated/Revoked comments  States she is doing well and declines further calls. Verified she has number for healthline if needed.           Shy Baldwin RN

## 2019-04-18 ENCOUNTER — OFFICE VISIT (OUTPATIENT)
Dept: RETAIL CLINIC | Facility: CLINIC | Age: 69
End: 2019-04-18

## 2019-04-18 VITALS
HEIGHT: 62 IN | HEART RATE: 96 BPM | SYSTOLIC BLOOD PRESSURE: 118 MMHG | RESPIRATION RATE: 18 BRPM | WEIGHT: 175 LBS | OXYGEN SATURATION: 99 % | BODY MASS INDEX: 32.2 KG/M2 | DIASTOLIC BLOOD PRESSURE: 70 MMHG | TEMPERATURE: 98.3 F

## 2019-04-18 DIAGNOSIS — J30.9 ALLERGIC RHINITIS, UNSPECIFIED SEASONALITY, UNSPECIFIED TRIGGER: ICD-10-CM

## 2019-04-18 DIAGNOSIS — R05.9 COUGH: ICD-10-CM

## 2019-04-18 DIAGNOSIS — H66.91 ACUTE OTITIS MEDIA, RIGHT: Primary | ICD-10-CM

## 2019-04-18 PROCEDURE — 99213 OFFICE O/P EST LOW 20 MIN: CPT | Performed by: NURSE PRACTITIONER

## 2019-04-18 RX ORDER — DEXTROMETHORPHAN HYDROBROMIDE AND PROMETHAZINE HYDROCHLORIDE 15; 6.25 MG/5ML; MG/5ML
5 SYRUP ORAL NIGHTLY PRN
Qty: 118 ML | Refills: 0 | Status: SHIPPED | OUTPATIENT
Start: 2019-04-18 | End: 2019-04-18

## 2019-04-18 RX ORDER — AMOXICILLIN 875 MG/1
875 TABLET, COATED ORAL 2 TIMES DAILY
Qty: 20 TABLET | Refills: 0 | Status: SHIPPED | OUTPATIENT
Start: 2019-04-18

## 2019-04-18 RX ORDER — PSEUDOEPHEDRINE HCL 30 MG
60 TABLET ORAL EVERY 6 HOURS PRN
Qty: 28 TABLET | Refills: 0 | Status: SHIPPED | OUTPATIENT
Start: 2019-04-18 | End: 2019-04-18

## 2019-04-18 RX ORDER — LORATADINE 10 MG/1
10 TABLET ORAL DAILY
Qty: 30 TABLET | Refills: 0 | Status: SHIPPED | OUTPATIENT
Start: 2019-04-18 | End: 2019-04-18

## 2019-04-18 RX ORDER — FLUTICASONE PROPIONATE 50 MCG
2 SPRAY, SUSPENSION (ML) NASAL DAILY
Qty: 1 BOTTLE | Refills: 0 | Status: SHIPPED | OUTPATIENT
Start: 2019-04-18

## 2019-04-18 RX ORDER — BROMPHENIRAMINE MALEATE, PSEUDOEPHEDRINE HYDROCHLORIDE, AND DEXTROMETHORPHAN HYDROBROMIDE 2; 30; 10 MG/5ML; MG/5ML; MG/5ML
5 SYRUP ORAL 4 TIMES DAILY PRN
Qty: 118 ML | Refills: 0 | Status: SHIPPED | OUTPATIENT
Start: 2019-04-18

## 2019-04-18 NOTE — PATIENT INSTRUCTIONS
Allergic Rhinitis, Adult  Allergic rhinitis is an allergic reaction that affects the mucous membrane inside the nose. It causes sneezing, a runny or stuffy nose, and the feeling of mucus going down the back of the throat (postnasal drip). Allergic rhinitis can be mild to severe.  There are two types of allergic rhinitis:  Seasonal. This type is also called hay fever. It happens only during certain seasons.  Perennial. This type can happen at any time of the year.    What are the causes?  This condition happens when the body's defense system (immune system) responds to certain harmless substances called allergens as though they were germs.   Seasonal allergic rhinitis is triggered by pollen, which can come from grasses, trees, and weeds. Perennial allergic rhinitis may be caused by:  House dust mites.  Pet dander.  Mold spores.    What are the signs or symptoms?  Symptoms of this condition include:  Sneezing.  Runny or stuffy nose (nasal congestion).  Postnasal drip.  Itchy nose.  Tearing of the eyes.  Trouble sleeping.  Daytime sleepiness.    How is this diagnosed?  This condition may be diagnosed based on:  Your medical history.  A physical exam.  Tests to check for related conditions, such as:  Asthma.  Pink eye.  Ear infection.  Upper respiratory infection.  Tests to find out which allergens trigger your symptoms. These may include skin or blood tests.    How is this treated?  There is no cure for this condition, but treatment can help control symptoms. Treatment may include:  Taking medicines that block allergy symptoms, such as antihistamines. Medicine may be given as a shot, nasal spray, or pill.  Avoiding the allergen.  Desensitization. This treatment involves getting ongoing shots until your body becomes less sensitive to the allergen. This treatment may be done if other treatments do not help.  If taking medicine and avoiding the allergen does not work, new, stronger medicines may be prescribed.    Follow  these instructions at home:  Find out what you are allergic to. Common allergens include smoke, dust, and pollen.  Avoid the things you are allergic to. These are some things you can do to help avoid allergens:  Replace carpet with wood, tile, or vinyl amanda. Carpet can trap dander and dust.  Do not smoke. Do not allow smoking in your home.  Change your heating and air conditioning filter at least once a month.  During allergy season:  Keep windows closed as much as possible.  Plan outdoor activities when pollen counts are lowest. This is usually during the evening hours.  When coming indoors, change clothing and shower before sitting on furniture or bedding.  Take over-the-counter and prescription medicines only as told by your health care provider.  Keep all follow-up visits as told by your health care provider. This is important.  Contact a health care provider if:  You have a fever.  You develop a persistent cough.  You make whistling sounds when you breathe (you wheeze).  Your symptoms interfere with your normal daily activities.  Get help right away if:  You have shortness of breath.  Summary  This condition can be managed by taking medicines as directed and avoiding allergens.  Contact your health care provider if you develop a persistent cough or fever.  During allergy season, keep windows closed as much as possible.  This information is not intended to replace advice given to you by your health care provider. Make sure you discuss any questions you have with your health care provider.  Document Released: 09/12/2002 Document Revised: 01/25/2018 Document Reviewed: 01/25/2018  [employer] Interactive Patient Education © 2019 [employer]  Cough, Adult  A cough helps to clear your throat and lungs. A cough may last only 2-3 weeks (acute), or it may last longer than 8 weeks (chronic). Many different things can cause a cough. A cough may be a sign of an illness or another medical condition.  Follow these  instructions at home:  · Pay attention to any changes in your cough.  · Take medicines only as told by your doctor.  ? If you were prescribed an antibiotic medicine, take it as told by your doctor. Do not stop taking it even if you start to feel better.  ? Talk with your doctor before you try using a cough medicine.  · Drink enough fluid to keep your pee (urine) clear or pale yellow.  · If the air is dry, use a cold steam vaporizer or humidifier in your home.  · Stay away from things that make you cough at work or at home.  · If your cough is worse at night, try using extra pillows to raise your head up higher while you sleep.  · Do not smoke, and try not to be around smoke. If you need help quitting, ask your doctor.  · Do not have caffeine.  · Do not drink alcohol.  · Rest as needed.  Contact a doctor if:  · You have new problems (symptoms).  · You cough up yellow fluid (pus).  · Your cough does not get better after 2-3 weeks, or your cough gets worse.  · Medicine does not help your cough and you are not sleeping well.  · You have pain that gets worse or pain that is not helped with medicine.  · You have a fever.  · You are losing weight and you do not know why.  · You have night sweats.  Get help right away if:  · You cough up blood.  · You have trouble breathing.  · Your heartbeat is very fast.  This information is not intended to replace advice given to you by your health care provider. Make sure you discuss any questions you have with your health care provider.  Document Released: 08/30/2012 Document Revised: 05/25/2017 Document Reviewed: 02/24/2016  [employer] Interactive Patient Education © 2019 [employer]  Otitis Media, Adult  Otitis media means that the middle ear is red and swollen (inflamed) and full of fluid. The condition usually goes away on its own.  Follow these instructions at home:  · Take over-the-counter and prescription medicines only as told by your doctor.  · If you were prescribed an  antibiotic medicine, take it as told by your doctor. Do not stop taking the antibiotic even if you start to feel better.  · Keep all follow-up visits as told by your doctor. This is important.  Contact a doctor if:  · You have bleeding from your nose.  · There is a lump on your neck.  · You are not getting better in 5 days.  · You feel worse instead of better.  Get help right away if:  · You have pain that is not helped with medicine.  · You have swelling, redness, or pain around your ear.  · You get a stiff neck.  · You cannot move part of your face (paralyzed).  · You notice that the bone behind your ear hurts when you touch it.  · You get a very bad headache.  Summary  · Otitis media means that the middle ear is red, swollen, and full of fluid.  · This condition usually goes away on its own. In some cases, treatment may be needed.  · If you were prescribed an antibiotic medicine, take it as told by your doctor.  This information is not intended to replace advice given to you by your health care provider. Make sure you discuss any questions you have with your health care provider.  Document Released: 06/05/2009 Document Revised: 01/08/2018 Document Reviewed: 01/08/2018  [employer] Interactive Patient Education © 2019 [employer]    See primary care provider in 2 weeks for ear recheck  Drink plenty of water  Go to ER if you develop shortness of breath, coughing blood or have chest pain  Avoid cough triggers and potential allergens as much as feasible  Steam heat inhalations and saline nasal mist advised

## 2019-04-18 NOTE — PROGRESS NOTES
Subjective   Cough    Susy Fitzpatrick is a 68 y.o. female who presents with cough and congestion.      Cough   This is a new problem. The current episode started in the past 7 days. The problem has been gradually worsening. The problem occurs every few minutes. The cough is non-productive. Associated symptoms include chills, ear congestion, nasal congestion, postnasal drip, rhinorrhea and a sore throat. Pertinent negatives include no chest pain, fever, headaches, heartburn, hemoptysis, myalgias, rash, shortness of breath or wheezing. The symptoms are aggravated by dust and animals. Risk factors for lung disease include animal exposure. Treatments tried: NyQuil. Improvement on treatment: minimal. Her past medical history is significant for bronchitis and environmental allergies.        History Obtained from: Patient    Past Medical History:   Diagnosis Date   • Anemia    • Arthritis     right hip    • Cancer (CMS/HCC) 2008   • Diffuse large B cell lymphoma (CMS/HCC) 2008   • Drug therapy     20 TREATMENTS 7337-1832   • GERD (gastroesophageal reflux disease)    • History of chemotherapy    • History of radiation therapy      Past Surgical History:   Procedure Laterality Date   • CHOLECYSTECTOMY     • COLONOSCOPY  08/2018   • ENDOSCOPY     • LYMPH NODE BIOPSY     • TONSILLECTOMY     • TOTAL HIP ARTHROPLASTY Right 2/11/2019    Procedure: TOTAL RIGHT HIP ARTHROPLASTY ANTERIOR;  Surgeon: Esdras Ramires MD;  Location: [facility];  Service: Orthopedics     Social History     Tobacco Use   • Smoking status: Never Smoker   • Smokeless tobacco: Never Used   Substance Use Topics   • Alcohol use: Yes     Comment: rare   • Drug use: No     Family History   Problem Relation Age of Onset   • Ovarian cancer Sister         unknown   • Cancer Father    • Ovarian cancer Mother         age unknown   • Breast cancer Neg Hx      No Known Allergies  Current Outpatient Medications   Medication Sig Dispense Refill   • amoxicillin (AMOXIL)  875 MG tablet Take 1 tablet by mouth 2 (Two) Times a Day. 20 tablet 0   • aspirin 325 MG EC tablet Take 1 tablet by mouth Daily For 1 month 30 tablet 0   • brompheniramine-pseudoephedrine-DM 30-2-10 MG/5ML syrup Take 5 mL by mouth 4 (Four) Times a Day As Needed for Congestion, Cough or Allergies. 118 mL 0   • docusate sodium 100 MG capsule Take 100 mg by mouth 2 (Two) Times a Day. 60 each 0   • esomeprazole (nexIUM) 20 MG capsule Take 20 mg by mouth Every Morning Before Breakfast.     • fluticasone (FLONASE) 50 MCG/ACT nasal spray 2 sprays into the nostril(s) as directed by provider Daily. 1 bottle 0   • ibuprofen (ADVIL,MOTRIN) 200 MG tablet Take 1 tablet by mouth Every 6 (Six) Hours As Needed for Mild Pain . Must take an hour after aspirin if needed.       No current facility-administered medications for this visit.         The following portions of the patient's history were reviewed and updated as appropriate: allergies, current medications, past family history, past medical history, past social history and past surgical history.    Review of Systems   Constitutional: Positive for chills. Negative for fever.   HENT: Positive for congestion, postnasal drip, rhinorrhea, sinus pressure, sneezing and sore throat. Negative for ear discharge, trouble swallowing and voice change.    Eyes: Negative.    Respiratory: Positive for cough. Negative for hemoptysis, shortness of breath and wheezing.    Cardiovascular: Negative for chest pain.   Gastrointestinal: Negative for heartburn, nausea and vomiting.   Endocrine: Negative.    Musculoskeletal: Positive for arthralgias (improved s/p hip replacement). Negative for myalgias, neck pain and neck stiffness.   Skin: Negative for rash and wound.   Allergic/Immunologic: Positive for environmental allergies.   Neurological: Negative for dizziness, light-headedness and headaches.   Hematological: Negative.    Psychiatric/Behavioral: Positive for sleep disturbance (due to cough).  "Negative for agitation. The patient is not nervous/anxious.        Objective     VITAL SIGNS:   Vitals:    04/18/19 1137   BP: 118/70   Pulse: 96   Resp: 18   Temp: 98.3 °F (36.8 °C)   SpO2: 99%   Weight: 79.4 kg (175 lb)   Height: 157.5 cm (62\")   Body mass index is 32.01 kg/m².    Physical Exam   Constitutional:  Non-toxic appearance. No distress.   HENT:   Head: Atraumatic.   Right Ear: External ear and ear canal normal. Tympanic membrane is erythematous (mild) and bulging. A middle ear effusion is present.   Left Ear: A middle ear effusion is present.   Nose: Mucosal edema (brisk erythema left nasal mucosa) and rhinorrhea present. No nasal deformity.   Mouth/Throat: Uvula is midline, oropharynx is clear and moist and mucous membranes are normal. No uvula swelling.   Eyes: Pupils are equal, round, and reactive to light. No scleral icterus.   Neck: No tracheal deviation present.   Cardiovascular: Normal rate and regular rhythm.   No murmur heard.  Pulmonary/Chest: No accessory muscle usage. No tachypnea. No respiratory distress. She has decreased breath sounds (subtle decrease breath sounds RUL heard anteriorly and posteriorly).   Lymphadenopathy:     She has no cervical adenopathy.   Neurological: She is alert. She is not disoriented.   Skin: Skin is warm and dry. Capillary refill takes less than 2 seconds. No cyanosis.   Psychiatric: Her behavior is normal. She is attentive.         Assessment/Plan     Susy was seen today for cough.    Diagnoses and all orders for this visit:    Acute otitis media, right  -     amoxicillin (AMOXIL) 875 MG tablet; Take 1 tablet by mouth 2 (Two) Times a Day.    Allergic rhinitis, unspecified seasonality, unspecified trigger  -     fluticasone (FLONASE) 50 MCG/ACT nasal spray; 2 sprays into the nostril(s) as directed by provider Daily.    Cough.  -     brompheniramine-pseudoephedrine-DM 30-2-10 MG/5ML syrup; Take 5 mL by mouth 4 (Four) Times a Day As Needed for Congestion, Cough " or Allergies.        PLAN:  Discussed avoidance of offending allergens and cough triggers. OTC management of symptoms discussed. Patient should follow up with primary care provider in 2 weeks for ear recheck. See sooner if symptoms fail to improve, worsen or for the development of new symptoms that need attention.    The patient voiced understanding and agreement to the patient treatment plan and instructions       JUAN Cordon

## 2019-05-09 NOTE — PLAN OF CARE
Problem: Patient Care Overview  Goal: Plan of Care Review  Pain controlled with Tylenol. Ambulated well in room and to Bathroom. Denies dizziness. No N/V. A/O x3.       etiology unclear and resolving without incident. Will advance diet. ALMA DELIA and IgG4 still pending. If tolerates diet can discharge with f/u with Dr. Bennett as outpatient.

## 2024-01-02 NOTE — PROGRESS NOTES
"Susy Honeycutt [facility]  [id number]  1950    /62   Pulse 70   Temp 97.8 °F (36.6 °C) (Oral)   Resp 18   Ht 157.5 cm (62\")   Wt 83.4 kg (183 lb 13.8 oz)   LMP 03/20/2001 Comment: mammogram- 2018   SpO2 99%   BMI 33.63 kg/m²     Lab Results (last 24 hours)     Procedure Component Value Units Date/Time    CK [[id number]] Collected:  02/12/19 0550    Specimen:  Blood Updated:  02/12/19 0802    BNP [[id number]]  (Normal) Collected:  02/12/19 0654    Specimen:  Blood Updated:  02/12/19 0747     BNP 33.0 pg/mL      Comment: Results may be falsely decreased if patient taking Biotin.       Phosphorus [[id number]]  (Normal) Collected:  02/12/19 0654    Specimen:  Blood Updated:  02/12/19 0729     Phosphorus 3.3 mg/dL     Troponin [[id number]]  (Normal) Collected:  02/12/19 0654    Specimen:  Blood Updated:  02/12/19 0729     Troponin I <0.006 ng/mL      Comment: Results may be falsely decreased if patient taking Biotin.       Magnesium [[id number]]  (Normal) Collected:  02/12/19 0654    Specimen:  Blood Updated:  02/12/19 0729     Magnesium 1.7 mg/dL     Lactic Acid, Plasma [[id number]]  (Normal) Collected:  02/12/19 0654    Specimen:  Blood Updated:  02/12/19 0724     Lactate 1.9 mmol/L      Comment: Falsely depressed results may occur on samples drawn from patients receiving N-Acetylcysteine (NAC) or Metamizole.       CBC (No Diff) [[id number]]  (Abnormal) Collected:  02/12/19 0654    Specimen:  Blood Updated:  02/12/19 0707     WBC 7.99 10*3/mm3      RBC 3.43 10*6/mm3      Hemoglobin 9.9 g/dL      Hematocrit 30.2 %      MCV 88.0 fL      MCH 28.9 pg      MCHC 32.8 g/dL      RDW 13.7 %      RDW-SD 43.8 fl      MPV 9.2 fL      Platelets 160 10*3/mm3     CK-MB [[id number]] Collected:  02/12/19 0550    Specimen:  Blood Updated:  02/12/19 0657    CBC & Differential [[id number]] Collected:  02/12/19 0550    Specimen:  Blood Updated:  02/12/19 0652    Narrative:       The following orders were created for panel order CBC & " Differential.  Procedure                               Abnormality         Status                     ---------                               -----------         ------                     CBC Auto Differential[[id number]]        Abnormal            Final result                 Please view results for these tests on the individual orders.    CBC Auto Differential [[id number]]  (Abnormal) Collected:  02/12/19 0550    Specimen:  Blood Updated:  02/12/19 0652     WBC 7.15 10*3/mm3      RBC 3.40 10*6/mm3      Hemoglobin 9.8 g/dL      Hematocrit 29.7 %      MCV 87.4 fL      MCH 28.8 pg      MCHC 33.0 g/dL      RDW 13.6 %      RDW-SD 43.7 fl      MPV 9.3 fL      Platelets 172 10*3/mm3      Neutrophil % 73.8 %      Lymphocyte % 21.4 %      Monocyte % 4.2 %      Eosinophil % 0.3 %      Basophil % 0.3 %      Immature Grans % 0.1 %      Neutrophils, Absolute 5.28 10*3/mm3      Lymphocytes, Absolute 1.53 10*3/mm3      Monocytes, Absolute 0.30 10*3/mm3      Eosinophils, Absolute 0.02 10*3/mm3      Basophils, Absolute 0.02 10*3/mm3      Immature Grans, Absolute 0.01 10*3/mm3     Narrative:       Verified by repeat analysis.    Basic Metabolic Panel [[id number]]  (Abnormal) Collected:  02/12/19 0550    Specimen:  Blood Updated:  02/12/19 0651     Glucose 140 mg/dL      BUN 14 mg/dL      Creatinine 0.82 mg/dL      Sodium 136 mmol/L      Potassium 4.3 mmol/L      Chloride 103 mmol/L      CO2 27.0 mmol/L      Calcium 9.0 mg/dL      eGFR Non African Amer 70 mL/min/1.73      BUN/Creatinine Ratio 17.1     Anion Gap 6.0 mmol/L     Narrative:       [employer] Guidelines    Stage     Description        GFR  1         Normal or High     90+  2         Mild decrease      60-89  3         Moderate decrease  30-59  4         Severe decrease    15-29  5         Kidney failure     <15    The MDRD GFR formula is only valid for adults with stable renal function between ages 18 and 70.    POC Glucose Once [[id number]]  (Normal)  Collected:  02/12/19 0632    Specimen:  Blood Updated:  02/12/19 0633     Glucose 125 mg/dL           Patient Care Team:  Blayne Kirby MD as PCP - General (Family Medicine)  Blayne Kirby MD as PCP - Claims Attributed    SUBJECTIVE  Rapid Response required this morning  Good start in physical therapy yesterday.    PHYSICAL EXAM  Incision benign  Minimal thigh swelling  Neurovascularly intact to knees and toes       Status post total replacement of right hip    Arthritis of right hip    GERD (gastroesophageal reflux disease)      PLAN / DISPOSITION:  Hemoglobin 9.8 today - no transfusion anticipated  Discharge home when cleared medically    Esdras Ramires MD  02/12/19  8:04 AM   high risk screen

## (undated) DEVICE — STERILE PVP: Brand: MEDLINE INDUSTRIES, INC.

## (undated) DEVICE — ANTIBACTERIAL UNDYED BRAIDED (POLYGLACTIN 910), SYNTHETIC ABSORBABLE SUTURE: Brand: COATED VICRYL

## (undated) DEVICE — PK MAJ TOTL HIP ANT 10

## (undated) DEVICE — BNDG ELAS CO-FLEX SLF ADHR 4IN5YD LF STRL

## (undated) DEVICE — TRY EPID SFTY 18G 3.5IN 1T7680

## (undated) DEVICE — COVER,LIGHT HANDLE,FLX,1/PK: Brand: MEDLINE INDUSTRIES, INC.

## (undated) DEVICE — INTENDED USE FOR SURGICAL MARKING ON INTACT SKIN, ALSO PROVIDES A PERMANENT METHOD OF IDENTIFYING OBJECTS IN THE OPERATING ROOM: Brand: WRITESITE® REGULAR TIP SKIN MARKER

## (undated) DEVICE — GLV SURG SIGNATURE TOUCH PF LTX 8 STRL BX/50

## (undated) DEVICE — SYS SKIN CLS DERMABOND PRINEO W/22CM MESH TP

## (undated) DEVICE — NDL SPINE 18G 31/2IN PNK

## (undated) DEVICE — 1 ML TUBERCULIN SYRINGE REGULAR TIP: Brand: MONOJECT

## (undated) DEVICE — ENCORE® LATEX MICRO SIZE 8, STERILE LATEX POWDER-FREE SURGICAL GLOVE: Brand: ENCORE

## (undated) DEVICE — SYR LUERLOK 50ML